# Patient Record
Sex: FEMALE | Race: ASIAN | Employment: FULL TIME | URBAN - METROPOLITAN AREA
[De-identification: names, ages, dates, MRNs, and addresses within clinical notes are randomized per-mention and may not be internally consistent; named-entity substitution may affect disease eponyms.]

---

## 2017-07-23 ENCOUNTER — OFFICE VISIT (OUTPATIENT)
Dept: FAMILY MEDICINE CLINIC | Facility: CLINIC | Age: 37
End: 2017-07-23

## 2017-07-23 VITALS
TEMPERATURE: 98 F | WEIGHT: 136 LBS | HEIGHT: 62 IN | HEART RATE: 76 BPM | BODY MASS INDEX: 25.03 KG/M2 | OXYGEN SATURATION: 99 % | SYSTOLIC BLOOD PRESSURE: 100 MMHG | DIASTOLIC BLOOD PRESSURE: 70 MMHG | RESPIRATION RATE: 12 BRPM

## 2017-07-23 DIAGNOSIS — H69.81 EUSTACHIAN TUBE DYSFUNCTION, RIGHT: Primary | ICD-10-CM

## 2017-07-23 PROCEDURE — 99213 OFFICE O/P EST LOW 20 MIN: CPT | Performed by: NURSE PRACTITIONER

## 2017-07-23 NOTE — PROGRESS NOTES
CHIEF COMPLAINT:   Patient presents with:  Ear Pain        HPI:   Armando Harper is a 40year old female who presents for  Right ear pain mainly behind ear going to throat, feels clogged with mild headache. Problem has been occurring for  3-4  days.   Not TM- bilateral:no erythema, mild bulging, + effusion. Hearing normal. Tenderness behind right ear near eustachian tube. EAR canals bilaterally: no erythema, no tenderness, no discharge.   NECK: supple,no adenopathy  LUNGS: clear to auscultation bilaterally But eustachian tube problems sometimes lead to serious problems, such as:    ?A middle ear infection  ? A torn eardrum  ? Hearing loss  Long-term hearing loss from eustachian tube problems can also lead to language or speech problems in children.     What cau - OTC nasal sprays like Afrin (oxymetazoline) can be used every 12 hours for  NO MORE than 3 days. Longer use leads to worsening congestion and blood  pressure issues. This medication should be avoided in patients with high blood  pressure  ? Antihistamin It often takes from several weeks up to 3 months for the fluid to clear on its own. Oral pain relievers and ear drops help if there is pain. Decongestants and antihistamines sometimes help. Antibiotics don't help since there is no infection.  Your doctor ma · Fever of 100.4°F (38°C) or higher, or as directed by your healthcare provider  · Fluid or blood draining from the ear  · Headache or sinus pain  · Stiff neck  · Unusual drowsiness or confusion  Date Last Reviewed: 10/1/2016  © 8923-4639 [employer]

## 2017-07-23 NOTE — PATIENT INSTRUCTIONS
Eustachian tube problems  Written by the doctors and editors at [facility]     What is the eustachian tube? — The eustachian tube is a tube that connects the middle ear (the part of the ear behind the eardrum) to the back of the nose and throat       Norm[place] Should I see a doctor or nurse? — See your doctor or nurse if your symptoms are severe, get worse, or if they don’t go away after a few days. Will I need tests? — Probably not.  Your doctor or nurse should be able to tell if you have a eustachian tube pr Earache, No Infection (Adult)  Earaches can happen without an infection. This occurs when air and fluid build up behind the eardrum causing a feeling of fullness and discomfort and reduced hearing.  This is called otitis media with effusion (OME) or serous · You may use over-the-counter medicine as directed to control pain, unless another medicine was prescribed. If you have chronic liver or kidney disease or ever had a stomach ulcer or GI bleeding, talk with your doctor before using these medicines.  Aspirin

## 2017-09-05 ENCOUNTER — OFFICE VISIT (OUTPATIENT)
Dept: FAMILY MEDICINE CLINIC | Facility: CLINIC | Age: 37
End: 2017-09-05

## 2017-09-05 VITALS
HEART RATE: 76 BPM | OXYGEN SATURATION: 98 % | SYSTOLIC BLOOD PRESSURE: 110 MMHG | DIASTOLIC BLOOD PRESSURE: 80 MMHG | TEMPERATURE: 98 F | RESPIRATION RATE: 16 BRPM

## 2017-09-05 DIAGNOSIS — J02.9 PHARYNGITIS, UNSPECIFIED ETIOLOGY: Primary | ICD-10-CM

## 2017-09-05 LAB — CONTROL LINE PRESENT WITH A CLEAR BACKGROUND (YES/NO): YES YES/NO

## 2017-09-05 PROCEDURE — 87081 CULTURE SCREEN ONLY: CPT | Performed by: PHYSICIAN ASSISTANT

## 2017-09-05 PROCEDURE — 87880 STREP A ASSAY W/OPTIC: CPT | Performed by: PHYSICIAN ASSISTANT

## 2017-09-05 PROCEDURE — 99213 OFFICE O/P EST LOW 20 MIN: CPT | Performed by: PHYSICIAN ASSISTANT

## 2017-09-05 NOTE — PROGRESS NOTES
CHIEF COMPLAINT:   Patient presents with:  Headache: sore throat, fever. Tmax less than 100.  2-3 days. HPI:   Syeda Alonso is 40year old female presents to clinic with complaint of  sore throat, headache, mild bodyache, fatigue.   Symptoms 3 day hour(s))  -STREP A ASSAY W/OPTIC   Collection Time: 09/05/17  6:26 PM   Result Value Ref Range   STREP GRP A CUL-SCR neg Negative   Control Line Present with a clear background (yes/no) yes Yes/No   Kit Lot # [id number] Numeric   Kit Expiration Date 1/31/1

## 2017-09-05 NOTE — PATIENT INSTRUCTIONS
Viral Pharyngitis (Sore Throat)    You (or your child, if your child is the patient) have pharyngitis (sore throat). This infection is caused by a virus. It can cause throat pain that is worse when swallowing, aching all over, headache, and fever.  The in · Fever as directed by your doctor.  For children, seek care if:  ¨ Your child is of any age and has repeated fevers above 104°F (40°C). ¨ Your child is younger than 3years of age and has a fever of 100.4°F (38°C) that continues for more than 1 day.   Yolanda Guevara

## 2017-09-13 ENCOUNTER — OFFICE VISIT (OUTPATIENT)
Dept: FAMILY MEDICINE CLINIC | Facility: CLINIC | Age: 37
End: 2017-09-13

## 2017-09-13 VITALS
OXYGEN SATURATION: 100 % | HEART RATE: 99 BPM | WEIGHT: 140 LBS | SYSTOLIC BLOOD PRESSURE: 114 MMHG | DIASTOLIC BLOOD PRESSURE: 78 MMHG | TEMPERATURE: 98 F | RESPIRATION RATE: 20 BRPM | BODY MASS INDEX: 26 KG/M2

## 2017-09-13 DIAGNOSIS — J30.1 ACUTE ALLERGIC RHINITIS DUE TO POLLEN, UNSPECIFIED SEASONALITY: Primary | ICD-10-CM

## 2017-09-13 PROCEDURE — 99213 OFFICE O/P EST LOW 20 MIN: CPT | Performed by: NURSE PRACTITIONER

## 2017-09-13 RX ORDER — FLUTICASONE PROPIONATE 50 MCG
2 SPRAY, SUSPENSION (ML) NASAL DAILY
Qty: 1 BOTTLE | Refills: 3 | Status: SHIPPED | OUTPATIENT
Start: 2017-09-13 | End: 2018-09-08

## 2017-09-13 NOTE — PROGRESS NOTES
CHIEF COMPLAINT:   Patient presents with:  Sore Throat        HPI:     The patient complains of sore throat, loss of voice and post nasal drip. Has had for 10 days. Symptoms include: nasal congestion,clear rhinorrhea, ST, and slight cough.  Denies fever or NECK: supple, no adenopathy  LUNGS: clear to auscultation  CARDIO: RRR without murmur      ASSESSMENT AND PLAN:   Sherly Mccoy is a 40year old female who presents with allergy symptoms.     ASSESSMENT:  Acute allergic rhinitis due to pollen, unspecified se Common nasal allergy symptoms  Allergies can cause nasal tissue to swell. This makes the air passages smaller. The nose may feel stuffed up. The nose may also make extra mucus, which can plug the nasal passages or drip out of the nose.  Mucus can drip down

## 2017-09-13 NOTE — PATIENT INSTRUCTIONS
Patient advised to start antihistamine (Claritin/Zyrtec/Allegra) daily along with nasal steroid spray the entire growing season.      Shower before bedtime and pollen avoidance, keep windows closed        Understanding Nasal Allergies  Nasal allergies (also Other health problems can cause symptoms like those of nasal allergies.  These include:  · Nonallergic rhinitis and viruses such as colds  · Irritants and pollutants, such as strong odors or smoke  · Certain medicines  · Changes in the weather   Treatment

## 2017-10-03 ENCOUNTER — OFFICE VISIT (OUTPATIENT)
Dept: FAMILY MEDICINE CLINIC | Facility: CLINIC | Age: 37
End: 2017-10-03

## 2017-10-03 VITALS
BODY MASS INDEX: 25.62 KG/M2 | HEART RATE: 74 BPM | HEIGHT: 62 IN | DIASTOLIC BLOOD PRESSURE: 62 MMHG | SYSTOLIC BLOOD PRESSURE: 98 MMHG | RESPIRATION RATE: 20 BRPM | WEIGHT: 139.25 LBS | TEMPERATURE: 98 F

## 2017-10-03 DIAGNOSIS — Z00.00 ROUTINE GENERAL MEDICAL EXAMINATION AT HEALTH CARE FACILITY: Primary | ICD-10-CM

## 2017-10-03 PROCEDURE — 99395 PREV VISIT EST AGE 18-39: CPT | Performed by: FAMILY MEDICINE

## 2017-10-03 PROCEDURE — 90686 IIV4 VACC NO PRSV 0.5 ML IM: CPT | Performed by: FAMILY MEDICINE

## 2017-10-03 PROCEDURE — 90471 IMMUNIZATION ADMIN: CPT | Performed by: FAMILY MEDICINE

## 2017-10-03 NOTE — PROGRESS NOTES
Syeda Alonso is a 40year old female here for Patient presents with: Well Adult: Cpx only    HPI:   Patient is seen for her annual physical.  Due for pap next year  Does do breast self exam at home.       PAST MEDICAL HISTORY:     Diagnosis Date   • Hypert incontinence with urination. OB/GYN: Not pregnant, menses regular, not excessive in amount, no dysmenorrhea. No sexual dysfunction or difficulty with libido. Rheumatologic: no joint pain, swelling, stiffness. No myalgias.  No history of autoimmune disorde THYROID STIM HORMONE; Future  -     LIPID PANEL; Future  -     COMP METABOLIC PANEL (14);  Future  -     VITAMIN D, 25-HYDROXY; Future    Other orders  -     FLULAVAL INFLUENZA VACCINE QUAD PRESERVATIVE FREE 0.5 ML

## 2017-10-19 ENCOUNTER — LABORATORY ENCOUNTER (OUTPATIENT)
Dept: LAB | Age: 37
End: 2017-10-19
Attending: FAMILY MEDICINE
Payer: COMMERCIAL

## 2017-10-19 DIAGNOSIS — Z00.00 ROUTINE GENERAL MEDICAL EXAMINATION AT HEALTH CARE FACILITY: ICD-10-CM

## 2017-10-19 PROCEDURE — 36415 COLL VENOUS BLD VENIPUNCTURE: CPT | Performed by: FAMILY MEDICINE

## 2017-10-19 PROCEDURE — 80050 GENERAL HEALTH PANEL: CPT | Performed by: FAMILY MEDICINE

## 2017-10-19 PROCEDURE — 80061 LIPID PANEL: CPT | Performed by: FAMILY MEDICINE

## 2017-10-19 PROCEDURE — 82306 VITAMIN D 25 HYDROXY: CPT | Performed by: FAMILY MEDICINE

## 2018-01-09 DIAGNOSIS — E55.9 VITAMIN D DEFICIENCY: Primary | ICD-10-CM

## 2018-01-09 NOTE — TELEPHONE ENCOUNTER
No future appointments.     LOV     LAST LAB    Ref Range & Units 10/19/17  9:26 AM   25-Hydroxyvitamin D (Total) 30.0 - 100.0 ng/mL 20.7     Comments:           LAST RX  ergocalciferol 94915 units Oral Cap 4 capsule 2 10/20/2017 11/19/2017   Sig :  Take 1

## 2018-01-10 RX ORDER — ERGOCALCIFEROL 1.25 MG/1
CAPSULE ORAL
Qty: 4 CAPSULE | Refills: 2 | OUTPATIENT
Start: 2018-01-10

## 2018-09-13 ENCOUNTER — OFFICE VISIT (OUTPATIENT)
Dept: FAMILY MEDICINE CLINIC | Facility: CLINIC | Age: 38
End: 2018-09-13
Payer: COMMERCIAL

## 2018-09-13 VITALS
HEIGHT: 61 IN | BODY MASS INDEX: 26.06 KG/M2 | WEIGHT: 138 LBS | SYSTOLIC BLOOD PRESSURE: 102 MMHG | DIASTOLIC BLOOD PRESSURE: 70 MMHG | HEART RATE: 68 BPM | TEMPERATURE: 99 F | RESPIRATION RATE: 14 BRPM

## 2018-09-13 DIAGNOSIS — Z00.00 ROUTINE GENERAL MEDICAL EXAMINATION AT A HEALTH CARE FACILITY: Primary | ICD-10-CM

## 2018-09-13 DIAGNOSIS — Z01.419 ENCOUNTER FOR ROUTINE GYNECOLOGICAL EXAMINATION WITH PAPANICOLAOU SMEAR OF CERVIX: ICD-10-CM

## 2018-09-13 DIAGNOSIS — E55.9 VITAMIN D DEFICIENCY: ICD-10-CM

## 2018-09-13 PROCEDURE — 99395 PREV VISIT EST AGE 18-39: CPT | Performed by: FAMILY MEDICINE

## 2018-09-13 PROCEDURE — 88175 CYTOPATH C/V AUTO FLUID REDO: CPT | Performed by: FAMILY MEDICINE

## 2018-09-13 PROCEDURE — 87624 HPV HI-RISK TYP POOLED RSLT: CPT | Performed by: FAMILY MEDICINE

## 2018-09-13 NOTE — PATIENT INSTRUCTIONS
Prevention Guidelines, Women Ages 25 to 44  Screening tests and vaccines are an important part of managing your health. A screening test is done to find possible disorders or diseases in people who don't have any symptoms.  The goal is to find a disease e Type 2 diabetes All women with prediabetes Every year   Gonorrhea Sexually active women at increased risk for infection At routine exams   Hepatitis C Anyone at increased risk At routine exams   HIV All women should be tested at least once for HIV between Meningococcal Women at increased risk for infection should talk with their healthcare provider 1 or more doses   Pneumococcal conjugate vaccine (PCV13) and pneumococcal polysaccharide vaccine (PPSV23) Women at increased risk for infection should talk with © 8932-4109 [employer] [address] [address] [facility] [place], [facility]. All rights reserved. This information is not intended as a substitute for professional medical care. Always follow your healthcare professional's instructions.

## 2018-09-13 NOTE — PROGRESS NOTES
Mishel Schmidt is a 41year old female here for Patient presents with:  Patient presents with: Well Adult: Physical and pap    HPI:   Patient is seen for her annual physical.  Due for pap   Does do breast self exam at home.     PAST MEDICAL HISTORY:     Diag urgency or incontinence with urination. OB/GYN: Not pregnant, menses regular, not excessive in amount, no dysmenorrhea. No sexual dysfunction or difficulty with libido. Rheumatologic: no joint pain, swelling, stiffness. No myalgias.  No history of autoimm bilaterally. PSYCH: Well-groomed, appropriate mood and affect, good eye contact, normal speech and no thought disorder. ASSESSMENT AND PLAN:   Los Briggs was seen today for well adult.     Diagnoses and all orders for this visit:    Routine general medical e

## 2018-09-14 LAB — HPV I/H RISK 1 DNA SPEC QL NAA+PROBE: NEGATIVE

## 2018-09-17 LAB
LAST PAP RESULT: NORMAL
PAP HISTORY (OTHER THAN LAST PAP): NORMAL

## 2018-09-25 ENCOUNTER — LAB ENCOUNTER (OUTPATIENT)
Dept: LAB | Age: 38
End: 2018-09-25
Attending: FAMILY MEDICINE
Payer: COMMERCIAL

## 2018-09-25 DIAGNOSIS — Z00.00 ROUTINE GENERAL MEDICAL EXAMINATION AT A HEALTH CARE FACILITY: ICD-10-CM

## 2018-09-25 DIAGNOSIS — E55.9 VITAMIN D DEFICIENCY: ICD-10-CM

## 2018-09-25 LAB
ALBUMIN SERPL-MCNC: 3.5 G/DL (ref 3.5–4.8)
ALBUMIN/GLOB SERPL: 0.8 {RATIO} (ref 1–2)
ALP LIVER SERPL-CCNC: 74 U/L (ref 37–98)
ALT SERPL-CCNC: 24 U/L (ref 14–54)
ANION GAP SERPL CALC-SCNC: 5 MMOL/L (ref 0–18)
AST SERPL-CCNC: 19 U/L (ref 15–41)
BASOPHILS # BLD AUTO: 0.02 X10(3) UL (ref 0–0.1)
BASOPHILS NFR BLD AUTO: 0.2 %
BILIRUB SERPL-MCNC: 0.5 MG/DL (ref 0.1–2)
BUN BLD-MCNC: 8 MG/DL (ref 8–20)
BUN/CREAT SERPL: 9.1 (ref 10–20)
CALCIUM BLD-MCNC: 8.7 MG/DL (ref 8.3–10.3)
CHLORIDE SERPL-SCNC: 108 MMOL/L (ref 101–111)
CHOLEST SMN-MCNC: 181 MG/DL (ref ?–200)
CO2 SERPL-SCNC: 26 MMOL/L (ref 22–32)
CREAT BLD-MCNC: 0.88 MG/DL (ref 0.55–1.02)
EOSINOPHIL # BLD AUTO: 0.24 X10(3) UL (ref 0–0.3)
EOSINOPHIL NFR BLD AUTO: 2.8 %
ERYTHROCYTE [DISTWIDTH] IN BLOOD BY AUTOMATED COUNT: 12.9 % (ref 11.5–16)
GLOBULIN PLAS-MCNC: 4.4 G/DL (ref 2.5–4)
GLUCOSE BLD-MCNC: 78 MG/DL (ref 70–99)
HCT VFR BLD AUTO: 42.1 % (ref 34–50)
HDLC SERPL-MCNC: 30 MG/DL (ref 40–59)
HGB BLD-MCNC: 13.8 G/DL (ref 12–16)
IMMATURE GRANULOCYTE COUNT: 0.02 X10(3) UL (ref 0–1)
IMMATURE GRANULOCYTE RATIO %: 0.2 %
LDLC SERPL CALC-MCNC: 117 MG/DL (ref ?–100)
LYMPHOCYTES # BLD AUTO: 2.26 X10(3) UL (ref 0.9–4)
LYMPHOCYTES NFR BLD AUTO: 26.7 %
M PROTEIN MFR SERPL ELPH: 7.9 G/DL (ref 6.1–8.3)
MCH RBC QN AUTO: 30.8 PG (ref 27–33.2)
MCHC RBC AUTO-ENTMCNC: 32.8 G/DL (ref 31–37)
MCV RBC AUTO: 94 FL (ref 81–100)
MONOCYTES # BLD AUTO: 0.52 X10(3) UL (ref 0.1–1)
MONOCYTES NFR BLD AUTO: 6.2 %
NEUTROPHIL ABS PRELIM: 5.39 X10 (3) UL (ref 1.3–6.7)
NEUTROPHILS # BLD AUTO: 5.39 X10(3) UL (ref 1.3–6.7)
NEUTROPHILS NFR BLD AUTO: 63.9 %
NONHDLC SERPL-MCNC: 151 MG/DL (ref ?–130)
OSMOLALITY SERPL CALC.SUM OF ELEC: 285 MOSM/KG (ref 275–295)
PLATELET # BLD AUTO: 304 10(3)UL (ref 150–450)
POTASSIUM SERPL-SCNC: 5.1 MMOL/L (ref 3.6–5.1)
RBC # BLD AUTO: 4.48 X10(6)UL (ref 3.8–5.1)
RED CELL DISTRIBUTION WIDTH-SD: 44.2 FL (ref 35.1–46.3)
SODIUM SERPL-SCNC: 139 MMOL/L (ref 136–144)
TRIGL SERPL-MCNC: 172 MG/DL (ref 30–149)
TSI SER-ACNC: 3.87 MIU/ML (ref 0.35–5.5)
VIT D+METAB SERPL-MCNC: 18 NG/ML (ref 30–100)
VLDLC SERPL CALC-MCNC: 34 MG/DL (ref 0–30)
WBC # BLD AUTO: 8.5 X10(3) UL (ref 4–13)

## 2018-09-25 PROCEDURE — 82306 VITAMIN D 25 HYDROXY: CPT | Performed by: FAMILY MEDICINE

## 2018-09-25 PROCEDURE — 80061 LIPID PANEL: CPT | Performed by: FAMILY MEDICINE

## 2018-09-25 PROCEDURE — 80050 GENERAL HEALTH PANEL: CPT | Performed by: FAMILY MEDICINE

## 2019-01-16 ENCOUNTER — OFFICE VISIT (OUTPATIENT)
Dept: FAMILY MEDICINE CLINIC | Facility: CLINIC | Age: 39
End: 2019-01-16
Payer: COMMERCIAL

## 2019-01-16 VITALS
BODY MASS INDEX: 27 KG/M2 | HEART RATE: 79 BPM | OXYGEN SATURATION: 98 % | TEMPERATURE: 98 F | WEIGHT: 141.19 LBS | RESPIRATION RATE: 18 BRPM | DIASTOLIC BLOOD PRESSURE: 74 MMHG | SYSTOLIC BLOOD PRESSURE: 118 MMHG

## 2019-01-16 DIAGNOSIS — J01.10 ACUTE NON-RECURRENT FRONTAL SINUSITIS: Primary | ICD-10-CM

## 2019-01-16 DIAGNOSIS — E55.9 VITAMIN D DEFICIENCY: ICD-10-CM

## 2019-01-16 PROCEDURE — 99214 OFFICE O/P EST MOD 30 MIN: CPT | Performed by: FAMILY MEDICINE

## 2019-01-16 RX ORDER — AMOXICILLIN AND CLAVULANATE POTASSIUM 875; 125 MG/1; MG/1
1 TABLET, FILM COATED ORAL 2 TIMES DAILY
Qty: 14 TABLET | Refills: 0 | Status: SHIPPED | OUTPATIENT
Start: 2019-01-16 | End: 2019-01-23

## 2019-01-16 RX ORDER — FLUTICASONE PROPIONATE 50 MCG
2 SPRAY, SUSPENSION (ML) NASAL DAILY
Qty: 1 BOTTLE | Refills: 0 | Status: SHIPPED | OUTPATIENT
Start: 2019-01-16 | End: 2019-02-15

## 2019-01-16 NOTE — PROGRESS NOTES
CHIEF COMPLAINT: Patient presents with:  Headache: x4d  Runny Nose  Body ache and/or chills  Cough        HPI:     Leda Hart is a 41year old female presents for sore throat and nasal congestion.     Pt p/w her  today with a 4-day h/o of HA, sore breath and wheezing. Gastrointestinal: Negative for nausea, vomiting, abdominal pain and diarrhea. Musculoskeletal: Positive for myalgias. Pertinent positives and negatives noted in the the HPI.     PHYSICAL EXAM:   /74 (BP Location: Left 0. 5    • Total Protein 09/25/2018 7.9    • Albumin 09/25/2018 3.5    • Globulin  09/25/2018 4.4*   • A/G Ratio 09/25/2018 0.8*   • TSH 09/25/2018 3.870    • Cholesterol, Total 09/25/2018 181    • HDL Cholesterol 09/25/2018 30*   • Triglycerides 09/25/2018 50 MCG/ACT Nasal Suspension 1 Bottle 0     Si sprays by Each Nare route daily. • Amoxicillin-Pot Clavulanate 875-125 MG Oral Tab 14 tablet 0     Sig: Take 1 tablet by mouth 2 (two) times daily for 7 days.        Health Maintenance:  Influenza Vaccine(

## 2019-01-16 NOTE — PATIENT INSTRUCTIONS
Acute Sinusitis    Acute sinusitis is irritation and swelling of the sinuses. It is usually caused by a viral infection after a common cold. Your doctor can help you find relief. What is acute sinusitis?   Sinuses are air-filled spaces in the skull behin © 4636-4671 [employer] [address]. [address] [facility], [facility] [place]. All rights reserved. This information is not intended as a substitute for professional medical care. Always follow your healthcare professional's instructions.

## 2019-01-17 RX ORDER — ERGOCALCIFEROL 1.25 MG/1
CAPSULE ORAL
Qty: 4 CAPSULE | Refills: 2 | OUTPATIENT
Start: 2019-01-17

## 2019-02-12 DIAGNOSIS — J01.10 ACUTE NON-RECURRENT FRONTAL SINUSITIS: ICD-10-CM

## 2019-02-12 RX ORDER — FLUTICASONE PROPIONATE 50 MCG
SPRAY, SUSPENSION (ML) NASAL
Refills: 0 | OUTPATIENT
Start: 2019-02-12

## 2019-02-12 NOTE — TELEPHONE ENCOUNTER
Refill denied for fluticasone, refill denied ordered by [facility] provider

## 2019-02-16 ENCOUNTER — APPOINTMENT (OUTPATIENT)
Dept: GENERAL RADIOLOGY | Facility: HOSPITAL | Age: 39
End: 2019-02-16
Attending: EMERGENCY MEDICINE
Payer: COMMERCIAL

## 2019-02-16 ENCOUNTER — HOSPITAL ENCOUNTER (EMERGENCY)
Facility: HOSPITAL | Age: 39
Discharge: HOME | End: 2019-02-16
Attending: EMERGENCY MEDICINE
Payer: COMMERCIAL

## 2019-02-16 VITALS
HEIGHT: 62 IN | SYSTOLIC BLOOD PRESSURE: 129 MMHG | RESPIRATION RATE: 16 BRPM | OXYGEN SATURATION: 100 % | HEART RATE: 70 BPM | TEMPERATURE: 98 F | BODY MASS INDEX: 24.84 KG/M2 | DIASTOLIC BLOOD PRESSURE: 84 MMHG | WEIGHT: 135 LBS

## 2019-02-16 DIAGNOSIS — S70.01XA CONTUSION OF RIGHT HIP, INITIAL ENCOUNTER: Primary | ICD-10-CM

## 2019-02-16 DIAGNOSIS — J01.10 ACUTE NON-RECURRENT FRONTAL SINUSITIS: ICD-10-CM

## 2019-02-16 PROCEDURE — 73502 X-RAY EXAM HIP UNI 2-3 VIEWS: CPT | Performed by: EMERGENCY MEDICINE

## 2019-02-16 PROCEDURE — 99283 EMERGENCY DEPT VISIT LOW MDM: CPT

## 2019-02-16 RX ORDER — HYDROCODONE BITARTRATE AND ACETAMINOPHEN 5; 325 MG/1; MG/1
1-2 TABLET ORAL EVERY 4 HOURS PRN
Qty: 10 TABLET | Refills: 0 | Status: SHIPPED | OUTPATIENT
Start: 2019-02-16 | End: 2019-02-23

## 2019-02-17 NOTE — ED INITIAL ASSESSMENT (HPI)
Patient complaining of low back pain after falling approximately one hour ago. Patient states she slipped and fell down 2 steps. Denies LOC. Denies head injury. Denies neck pain. Complaining of lower back pain.  Patient given Fentanyl IV push by EMS en rout

## 2019-02-18 RX ORDER — FLUTICASONE PROPIONATE 50 MCG
SPRAY, SUSPENSION (ML) NASAL
Refills: 0 | OUTPATIENT
Start: 2019-02-18

## 2019-11-25 ENCOUNTER — TELEPHONE (OUTPATIENT)
Dept: FAMILY MEDICINE CLINIC | Facility: CLINIC | Age: 39
End: 2019-11-25

## 2019-11-25 ENCOUNTER — OFFICE VISIT (OUTPATIENT)
Dept: FAMILY MEDICINE CLINIC | Facility: CLINIC | Age: 39
End: 2019-11-25
Payer: COMMERCIAL

## 2019-11-25 VITALS
RESPIRATION RATE: 16 BRPM | WEIGHT: 142 LBS | HEART RATE: 73 BPM | BODY MASS INDEX: 27.52 KG/M2 | SYSTOLIC BLOOD PRESSURE: 120 MMHG | HEIGHT: 60.35 IN | TEMPERATURE: 99 F | OXYGEN SATURATION: 97 % | DIASTOLIC BLOOD PRESSURE: 80 MMHG

## 2019-11-25 DIAGNOSIS — J02.9 SORE THROAT: ICD-10-CM

## 2019-11-25 DIAGNOSIS — J01.00 ACUTE MAXILLARY SINUSITIS, RECURRENCE NOT SPECIFIED: Primary | ICD-10-CM

## 2019-11-25 PROCEDURE — 87880 STREP A ASSAY W/OPTIC: CPT | Performed by: FAMILY MEDICINE

## 2019-11-25 PROCEDURE — 99214 OFFICE O/P EST MOD 30 MIN: CPT | Performed by: FAMILY MEDICINE

## 2019-11-25 RX ORDER — AMOXICILLIN AND CLAVULANATE POTASSIUM 875; 125 MG/1; MG/1
1 TABLET, FILM COATED ORAL 2 TIMES DAILY
Qty: 14 TABLET | Refills: 0 | Status: SHIPPED | OUTPATIENT
Start: 2019-11-25 | End: 2019-12-02

## 2019-11-25 RX ORDER — OMEGA-3 FATTY ACIDS/FISH OIL 300-1000MG
CAPSULE ORAL
COMMUNITY
End: 2020-01-31 | Stop reason: ALTCHOICE

## 2019-11-25 NOTE — PROGRESS NOTES
CHIEF COMPLAINT: Patient presents with:  Cough: running nose, headache, sore throat, yellow phlegm x5 days ago        HPI:     Robert Leal is a 46year old female presents for cold symptoms and cough.     Lizzette Guerrero is a 45 yo F p/w cough and cold symptoms x 5 shortness of breath. Gastrointestinal: Negative for nausea, vomiting, abdominal pain and diarrhea. Musculoskeletal: Positive for myalgias. Neurological: Negative for headaches. Pertinent positives and negatives noted in the the HPI.     PHYSIC • Kit Expiration Date 11/25/[id number]       REVIEWED THIS VISIT  ASSESSMENT/PLAN:   46year old female with    1.  Acute maxillary sinusitis, recurrence not specified  Day #5 of illness, explained to pt this is likely a viral infection and expect to

## 2019-11-25 NOTE — TELEPHONE ENCOUNTER
E scripts are down systemwide. Rx for Augmentin from 11/25/[facility] visit was called in.   Spoke with pharmacist.

## 2020-01-31 ENCOUNTER — OFFICE VISIT (OUTPATIENT)
Dept: FAMILY MEDICINE CLINIC | Facility: CLINIC | Age: 40
End: 2020-01-31
Payer: COMMERCIAL

## 2020-01-31 ENCOUNTER — TELEPHONE (OUTPATIENT)
Dept: FAMILY MEDICINE CLINIC | Facility: CLINIC | Age: 40
End: 2020-01-31

## 2020-01-31 VITALS
WEIGHT: 141 LBS | TEMPERATURE: 98 F | BODY MASS INDEX: 27 KG/M2 | DIASTOLIC BLOOD PRESSURE: 92 MMHG | SYSTOLIC BLOOD PRESSURE: 130 MMHG | HEART RATE: 93 BPM | RESPIRATION RATE: 16 BRPM | OXYGEN SATURATION: 98 %

## 2020-01-31 DIAGNOSIS — R03.0 ELEVATED BP WITHOUT DIAGNOSIS OF HYPERTENSION: ICD-10-CM

## 2020-01-31 DIAGNOSIS — R42 DIZZINESS: Primary | ICD-10-CM

## 2020-01-31 DIAGNOSIS — R51.9 ACUTE NONINTRACTABLE HEADACHE, UNSPECIFIED HEADACHE TYPE: ICD-10-CM

## 2020-01-31 PROCEDURE — 99213 OFFICE O/P EST LOW 20 MIN: CPT | Performed by: PHYSICIAN ASSISTANT

## 2020-01-31 RX ORDER — MECLIZINE HYDROCHLORIDE 25 MG/1
25 TABLET ORAL 3 TIMES DAILY PRN
Qty: 20 TABLET | Refills: 0 | Status: SHIPPED | OUTPATIENT
Start: 2020-01-31 | End: 2020-03-06 | Stop reason: ALTCHOICE

## 2020-01-31 NOTE — PROGRESS NOTES
CHIEF COMPLAINT:     Patient presents with:  Dizziness  Headache  :      HPI:     Harsh Decker is a 46year old female presents with complaints of dizziness. Symptoms mostly since this morning.      She describes a \"feeling of being in a different plane\" NEURO: No seizure, tremor, confusion, no focal weakness or abnormal sensation    EXAM:   BP (!) 130/92   Pulse 93   Temp 98 °F (36.7 °C)   Resp 16   Wt 141 lb (64 kg)   LMP 01/03/2020   SpO2 98%   BMI 27.22 kg/m²   GENERAL:WNWD NAD ambulated into room with Signed Prescriptions Disp Refills   • Meclizine HCl 25 MG Oral Tab 20 tablet 0     Sig: Take 1 tablet (25 mg total) by mouth 3 (three) times daily as needed. Risks, benefits, side effects of medication explained/discussed.     Patient Instructions The healthcare provider will ask about your symptoms and your medical history. He or she will examine you. You may have hearing and balance tests. As part of the exam, your healthcare provider may have you move your head and body in certain ways.  If you ha

## 2020-01-31 NOTE — TELEPHONE ENCOUNTER
Spoke to patient who states woke up this am feeling dizzy and nauseous when she stands. Also has slight HA in the back of her head. Denies vomiting, visual changes, N/T or other symptoms. Took her B/P: 140/100. No history of hypertension.   Discussed with

## 2020-02-05 ENCOUNTER — OFFICE VISIT (OUTPATIENT)
Dept: FAMILY MEDICINE CLINIC | Facility: CLINIC | Age: 40
End: 2020-02-05
Payer: COMMERCIAL

## 2020-02-05 VITALS
BODY MASS INDEX: 27.71 KG/M2 | HEIGHT: 60.35 IN | DIASTOLIC BLOOD PRESSURE: 84 MMHG | OXYGEN SATURATION: 98 % | HEART RATE: 76 BPM | WEIGHT: 143 LBS | SYSTOLIC BLOOD PRESSURE: 124 MMHG | RESPIRATION RATE: 18 BRPM | TEMPERATURE: 98 F

## 2020-02-05 DIAGNOSIS — R51.9 HEADACHE DISORDER: Primary | ICD-10-CM

## 2020-02-05 DIAGNOSIS — M62.838 SPASM OF MUSCLE: ICD-10-CM

## 2020-02-05 DIAGNOSIS — H81.13 BPPV (BENIGN PAROXYSMAL POSITIONAL VERTIGO), BILATERAL: ICD-10-CM

## 2020-02-05 DIAGNOSIS — M54.2 NECK PAIN: ICD-10-CM
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
## 2020-02-05 PROCEDURE — 99214 OFFICE O/P EST MOD 30 MIN: CPT | Performed by: FAMILY MEDICINE

## 2020-02-05 RX ORDER — CYCLOBENZAPRINE HCL 10 MG
10 TABLET ORAL NIGHTLY
Qty: 15 TABLET | Refills: 0 | Status: SHIPPED | OUTPATIENT
Start: 2020-02-05 | End: 2020-05-07

## 2020-02-05 RX ORDER — NAPROXEN 500 MG/1
500 TABLET ORAL 2 TIMES DAILY WITH MEALS
Qty: 30 TABLET | Refills: 0 | Status: SHIPPED | OUTPATIENT
Start: 2020-02-05 | End: 2020-05-07

## 2020-02-05 NOTE — PROGRESS NOTES
Kevin Dickey is a 46year old female.   Patient presents with:  Blood Pressure  Headache    HPI:   Patient complaining that she had dizziness on Thursday last week, states checked her blood pressure and it was 140/100, went to walk in clinic, blood pressure but with discomfort. LUNGS: clear to auscultation  CARDIO: RRR without murmur  NEURO: CN 2-12 are normal, no focal motor or sensory deficits on exam.    ASSESSMENT AND PLAN:   Eulalio Tripathi was seen today for blood pressure and headache.     Diagnoses and all orde

## 2020-02-05 NOTE — PATIENT INSTRUCTIONS
You can continue to see your chiropractor but if your neck pain, headache and dizziness are not better will refer to a physical therapist.    Please do the recommended Epley's exercises at home. You can continue to take meclizine as needed.       Neck Probl include:  · Heat. A special heating pad called a neck pack may be applied to your neck. · Exercises. Your PT will teach you exercises to help strengthen your neck and improve its range of motion.   · Joint mobilization. The PT gently moves your vertebrae t

## 2020-02-16 DIAGNOSIS — M62.838 SPASM OF MUSCLE: ICD-10-CM

## 2020-02-18 RX ORDER — CYCLOBENZAPRINE HCL 10 MG
10 TABLET ORAL NIGHTLY
Qty: 15 TABLET | Refills: 0 | OUTPATIENT
Start: 2020-02-18 | End: 2020-03-04

## 2020-02-18 NOTE — TELEPHONE ENCOUNTER
LOV 2/5/20   Return in about 2 weeks (around 2/19/2020). LAST LAB 9/18    LAST RX   cyclobenzaprine 10 MG Oral Tab 15 tablet 0 2/5/2020 2/20/2020   Sig:   Take 1 tablet (10 mg total) by mouth nightly for 15 days.            Next OV 3/6/2020      PROT

## 2020-03-06 ENCOUNTER — OFFICE VISIT (OUTPATIENT)
Dept: FAMILY MEDICINE CLINIC | Facility: CLINIC | Age: 40
End: 2020-03-06
Payer: COMMERCIAL

## 2020-03-06 VITALS
WEIGHT: 140 LBS | OXYGEN SATURATION: 98 % | RESPIRATION RATE: 18 BRPM | HEIGHT: 61 IN | DIASTOLIC BLOOD PRESSURE: 82 MMHG | BODY MASS INDEX: 26.43 KG/M2 | HEART RATE: 91 BPM | SYSTOLIC BLOOD PRESSURE: 126 MMHG | TEMPERATURE: 97 F

## 2020-03-06 DIAGNOSIS — Z00.00 ROUTINE GENERAL MEDICAL EXAMINATION AT A HEALTH CARE FACILITY: Primary | ICD-10-CM

## 2020-03-06 DIAGNOSIS — E55.9 VITAMIN D DEFICIENCY: ICD-10-CM

## 2020-03-06 PROCEDURE — 99395 PREV VISIT EST AGE 18-39: CPT | Performed by: FAMILY MEDICINE

## 2020-03-06 NOTE — PATIENT INSTRUCTIONS
Prevention Guidelines, Women Ages 25 to 44  Screening tests and vaccines are an important part of managing your health. A screening test is done to find possible disorders or diseases in people who don't have any symptoms.  The goal is to find a disease e Type 2 diabetes, prediabetes All women diagnosed with gestational diabetes Lifelong testing every 3 years   Type 2 diabetes All women with prediabetes Every year   Gonorrhea Sexually active women at increased risk for infection At routine exams   Hepatitis Measles, mumps, rubella (MMR) All women in this age group who have no record of these infections or vaccines 1 or 2 doses   Meningococcal Women at increased risk for infection should talk with their healthcare provider 1 or more doses   Pneumococcal conjug © 2769-4499 [employer] [address]. [address] [facility] [place], [facility] [place]. All rights reserved. This information is not intended as a substitute for professional medical care. Always follow your healthcare professional's instructions.

## 2020-03-09 NOTE — PROGRESS NOTES
Conor Fischer is a 46year old female. Patient presents with:  Physical    HPI:   Conor Fischer is a 46year old female seen today for her annual physical.  Pap done 2018 was normal.  Does do breast self exam, no family history of breast cancer.     Diet and polyuria. Genitourinary: Negative for dysuria, urgency, frequency and difficulty urinating. Musculoskeletal: Negative for myalgias, joint swelling, joint pain and gait problem. Skin: Negative for rash.    Allergic/Immunologic: Negative for food allerg at a health care facility  -     ASSAY, THYROID STIM HORMONE; Future  -     LIPID PANEL; Future  -     COMP METABOLIC PANEL (14); Future  -     CBC WITH DIFFERENTIAL WITH PLATELET;  Future  -     ASSAY, THYROID STIM HORMONE  -     LIPID PANEL  -     COMP ME

## 2020-03-14 LAB
ABSOLUTE BASOPHILS: 31 CELLS/UL (ref 0–200)
ABSOLUTE EOSINOPHILS: 242 CELLS/UL (ref 15–500)
ABSOLUTE LYMPHOCYTES: 2075 CELLS/UL (ref 850–3900)
ABSOLUTE MONOCYTES: 390 CELLS/UL (ref 200–950)
ABSOLUTE NEUTROPHILS: 5062 CELLS/UL (ref 1500–7800)
ALBUMIN/GLOBULIN RATIO: 1.1 (CALC) (ref 1–2.5)
ALBUMIN: 4.1 G/DL (ref 3.6–5.1)
ALKALINE PHOSPHATASE: 91 U/L (ref 31–125)
ALT: 23 U/L (ref 6–29)
AST: 20 U/L (ref 10–30)
BASOPHILS: 0.4 %
BILIRUBIN, TOTAL: 0.5 MG/DL (ref 0.2–1.2)
BUN: 9 MG/DL (ref 7–25)
CALCIUM: 9.3 MG/DL (ref 8.6–10.2)
CARBON DIOXIDE: 26 MMOL/L (ref 20–32)
CHLORIDE: 105 MMOL/L (ref 98–110)
CHOL/HDLC RATIO: 5.1 (CALC)
CHOLESTEROL, TOTAL: 209 MG/DL
CREATININE: 0.8 MG/DL (ref 0.5–1.1)
EGFR IF AFRICN AM: 108 ML/MIN/1.73M2
EGFR IF NONAFRICN AM: 94 ML/MIN/1.73M2
EOSINOPHILS: 3.1 %
GLOBULIN: 3.6 G/DL (CALC) (ref 1.9–3.7)
GLUCOSE: 86 MG/DL (ref 65–99)
HDL CHOLESTEROL: 41 MG/DL
HEMATOCRIT: 40.3 % (ref 35–45)
HEMOGLOBIN: 14 G/DL (ref 11.7–15.5)
LDL-CHOLESTEROL: 137 MG/DL (CALC)
LYMPHOCYTES: 26.6 %
MCH: 32 PG (ref 27–33)
MCHC: 34.7 G/DL (ref 32–36)
MCV: 92 FL (ref 80–100)
MONOCYTES: 5 %
MPV: 10.8 FL (ref 7.5–12.5)
NEUTROPHILS: 64.9 %
NON-HDL CHOLESTEROL: 168 MG/DL (CALC)
PLATELET COUNT: 293 THOUSAND/UL (ref 140–400)
POTASSIUM: 4.5 MMOL/L (ref 3.5–5.3)
PROTEIN, TOTAL: 7.7 G/DL (ref 6.1–8.1)
RDW: 12.1 % (ref 11–15)
RED BLOOD CELL COUNT: 4.38 MILLION/UL (ref 3.8–5.1)
SODIUM: 137 MMOL/L (ref 135–146)
TRIGLYCERIDES: 178 MG/DL
TSH: 3.06 MIU/L
VITAMIN D, 25-OH, TOTAL: 17 NG/ML (ref 30–100)
WHITE BLOOD CELL COUNT: 7.8 THOUSAND/UL (ref 3.8–10.8)

## 2020-05-07 ENCOUNTER — TELEMEDICINE (OUTPATIENT)
Dept: FAMILY MEDICINE CLINIC | Facility: CLINIC | Age: 40
End: 2020-05-07

## 2020-05-07 ENCOUNTER — PATIENT MESSAGE (OUTPATIENT)
Dept: FAMILY MEDICINE CLINIC | Facility: CLINIC | Age: 40
End: 2020-05-07

## 2020-05-07 VITALS — HEART RATE: 75 BPM | DIASTOLIC BLOOD PRESSURE: 110 MMHG | SYSTOLIC BLOOD PRESSURE: 150 MMHG

## 2020-05-07 DIAGNOSIS — R03.0 ELEVATED BLOOD-PRESSURE READING, WITHOUT DIAGNOSIS OF HYPERTENSION: ICD-10-CM

## 2020-05-07 DIAGNOSIS — R42 DIZZINESS: ICD-10-CM

## 2020-05-07 DIAGNOSIS — M62.838 SPASM OF MUSCLE: ICD-10-CM

## 2020-05-07 DIAGNOSIS — R51.9 HEADACHE IN BACK OF HEAD: ICD-10-CM

## 2020-05-07 DIAGNOSIS — M54.2 NECK PAIN: Primary | ICD-10-CM

## 2020-05-07 PROCEDURE — 99213 OFFICE O/P EST LOW 20 MIN: CPT | Performed by: FAMILY MEDICINE

## 2020-05-07 RX ORDER — NAPROXEN 500 MG/1
500 TABLET ORAL 2 TIMES DAILY WITH MEALS
Qty: 30 TABLET | Refills: 0 | Status: SHIPPED | OUTPATIENT
Start: 2020-05-07 | End: 2020-05-22

## 2020-05-07 RX ORDER — CYCLOBENZAPRINE HCL 10 MG
10 TABLET ORAL NIGHTLY
Qty: 15 TABLET | Refills: 0 | Status: SHIPPED | OUTPATIENT
Start: 2020-05-07 | End: 2020-05-22

## 2020-05-07 RX ORDER — MECLIZINE HYDROCHLORIDE 25 MG/1
25 TABLET ORAL 3 TIMES DAILY PRN
Qty: 20 TABLET | Refills: 0 | Status: SHIPPED | OUTPATIENT
Start: 2020-05-07 | End: 2020-06-19 | Stop reason: ALTCHOICE

## 2020-05-07 NOTE — PATIENT INSTRUCTIONS
Low-Salt Choices  Eating salt (sodium) can make your body retain too much water. Extra water makes your heart work harder. Canned, packaged, and frozen foods are easy to prepare. But they are often high in sodium.  Here are some ideas for low-salt foods y Your High Blood Pressure Risk Factors   Risk factors are things that make you more likely to have a disease or condition. Do you know your risk factors for high blood pressure? You can’t do anything about some risk factors.  But other risk factors are thing © 0834-9878 [employer] [address]. [address] [facility] [place], [facility] [place]. All rights reserved. This information is not intended as a substitute for professional medical care. Always follow your healthcare professional's instructions.

## 2020-05-07 NOTE — PROGRESS NOTES
Gregory Brewer is a 46year old female. Patient presents with:  Headache  Dizziness  Neck Pain    This visit is conducted using telemedicine with live interactive video and audio. Gregory Brewer verbally consents to virtual video visit.    Patient understands today for headache, dizziness and neck pain. Diagnoses and all orders for this visit:    Neck pain  -     naproxen 500 MG Oral Tab; Take 1 tablet (500 mg total) by mouth 2 (two) times daily with meals for 15 days.     Headache in back of head    Spasm of

## 2020-05-08 NOTE — TELEPHONE ENCOUNTER
From: Gabo Whittaker  To: Naveen Sevilla MD  Sent: 5/7/2020 10:30 PM CDT  Subject: Visit Follow-up Question    Hello Doctor,    Here is my BP reading for yesterday and today.     6th May 7 am - 110/77 63 pulse  6th May 7 pm - 128/97 82 pulse    7th May 7 a

## 2020-05-11 ENCOUNTER — PATIENT MESSAGE (OUTPATIENT)
Dept: FAMILY MEDICINE CLINIC | Facility: CLINIC | Age: 40
End: 2020-05-11

## 2020-05-11 NOTE — TELEPHONE ENCOUNTER
From: Mishel Schmidt  To: Debbie Lane MD  Sent: 5/11/2020 10:25 AM CDT  Subject: Visit Follow-up Question    Hello Doctor,    Here is my BP reading for 8th, 9th and 10th May.     8th May 7 am - 128/88 76 pulse   8th May 4 pm - 126/108 72 pulse  8th May

## 2020-05-13 ENCOUNTER — PATIENT MESSAGE (OUTPATIENT)
Dept: FAMILY MEDICINE CLINIC | Facility: CLINIC | Age: 40
End: 2020-05-13

## 2020-05-13 NOTE — TELEPHONE ENCOUNTER
From: Roberts Showers  To: Jumana Burch MD  Sent: 5/13/2020 11:04 AM CDT  Subject: Visit Follow-up Question    Hello doctor,    Here are my readings for the past 2 days.     11th May 7 am - 108/80 68 pulse   11th May 6pm - 134/104 68 pulse   11th May 10:3

## 2020-05-20 ENCOUNTER — PATIENT MESSAGE (OUTPATIENT)
Dept: FAMILY MEDICINE CLINIC | Facility: CLINIC | Age: 40
End: 2020-05-20

## 2020-05-20 DIAGNOSIS — M62.838 SPASM OF MUSCLE: ICD-10-CM

## 2020-05-20 DIAGNOSIS — M54.2 NECK PAIN: ICD-10-CM

## 2020-05-20 RX ORDER — NAPROXEN 500 MG/1
500 TABLET ORAL 2 TIMES DAILY WITH MEALS
Qty: 30 TABLET | Refills: 0 | OUTPATIENT
Start: 2020-05-20 | End: 2020-06-04

## 2020-05-20 RX ORDER — CYCLOBENZAPRINE HCL 10 MG
10 TABLET ORAL NIGHTLY
Qty: 15 TABLET | Refills: 0 | OUTPATIENT
Start: 2020-05-20 | End: 2020-06-04

## 2020-05-20 NOTE — TELEPHONE ENCOUNTER
If patient's neck pain is not better have her schedule an in office appointment with me for further evaluation.

## 2020-05-20 NOTE — TELEPHONE ENCOUNTER
LOV 3/6/2020    LAST LAB    LAST RX 5-27-20 5-7-20 15 tab    Next OV No future appointments.     PROTOCOL  Name from pharmacy: CYCLOBENZAPRINE 10 MG TABLET          Will file in chart as: CYCLOBENZAPRINE 10 MG Oral Tab         Sig: Take 1 tablet (10 mg tota

## 2020-05-21 NOTE — TELEPHONE ENCOUNTER
From: Raciel Anguiano  To: Tara Monroe MD  Sent: 5/20/2020 5:30 PM CDT  Subject: Visit Follow-up Question    Hi Doctor,    Please find below my readings for BP and Pulse over the past few days. Physically I am feeling much better . . just the muscle in

## 2020-05-22 ENCOUNTER — TELEMEDICINE (OUTPATIENT)
Dept: FAMILY MEDICINE CLINIC | Facility: CLINIC | Age: 40
End: 2020-05-22

## 2020-05-22 VITALS — SYSTOLIC BLOOD PRESSURE: 124 MMHG | HEART RATE: 80 BPM | DIASTOLIC BLOOD PRESSURE: 89 MMHG

## 2020-05-22 DIAGNOSIS — I10 ESSENTIAL HYPERTENSION, BENIGN: Primary | ICD-10-CM

## 2020-05-22 DIAGNOSIS — M62.838 SPASM OF MUSCLE: ICD-10-CM

## 2020-05-22 DIAGNOSIS — M54.2 NECK PAIN: ICD-10-CM

## 2020-05-22 PROCEDURE — 99213 OFFICE O/P EST LOW 20 MIN: CPT | Performed by: FAMILY MEDICINE

## 2020-05-22 RX ORDER — LISINOPRIL 5 MG/1
5 TABLET ORAL DAILY
Qty: 30 TABLET | Refills: 0 | Status: SHIPPED | OUTPATIENT
Start: 2020-05-22 | End: 2020-06-19

## 2020-05-22 NOTE — PROGRESS NOTES
Willard Benítez is a 46year old female. Patient presents with:  Hypertension: f/u. This visit is conducted using telemedicine with live interactive video and audio. Willard Benítez verbally consents to virtual video visit.    Patient understands and accepts Pulse 80   LMP 05/06/2020   GENERAL: well developed, well nourished,in no apparent distress  HEENT: atraumatic, normocephalic  NECK: NORMAL rom  LUNGS: act of breathing is normal  CARDIO: speaking in full sentences  EXTREMITIES: no edema    ASSESSMENT AND

## 2020-06-04 RX ORDER — ERGOCALCIFEROL 1.25 MG/1
CAPSULE ORAL
Qty: 12 CAPSULE | Refills: 0 | OUTPATIENT
Start: 2020-06-04

## 2020-06-15 DIAGNOSIS — I10 ESSENTIAL HYPERTENSION, BENIGN: ICD-10-CM

## 2020-06-15 NOTE — TELEPHONE ENCOUNTER
LOV 3/6/2020    LAST LAB 3-13-20    LAST RX 5-22-20 30*0    Next OV   Future Appointments   Date Time Provider Manjula Scarlet   6/19/2020  1:00 PM Clinton Morin MD [facility]         PROTOCOL    Name from pharmacy: LISINOPRIL 5 MG TABLET

## 2020-06-16 RX ORDER — LISINOPRIL 5 MG/1
TABLET ORAL
Qty: 30 TABLET | Refills: 0 | OUTPATIENT
Start: 2020-06-16

## 2020-06-19 ENCOUNTER — OFFICE VISIT (OUTPATIENT)
Dept: FAMILY MEDICINE CLINIC | Facility: CLINIC | Age: 40
End: 2020-06-19
Payer: COMMERCIAL

## 2020-06-19 VITALS
TEMPERATURE: 98 F | HEIGHT: 61 IN | BODY MASS INDEX: 24.55 KG/M2 | RESPIRATION RATE: 18 BRPM | SYSTOLIC BLOOD PRESSURE: 112 MMHG | OXYGEN SATURATION: 98 % | DIASTOLIC BLOOD PRESSURE: 90 MMHG | HEART RATE: 96 BPM | WEIGHT: 130 LBS

## 2020-06-19 DIAGNOSIS — I10 ESSENTIAL HYPERTENSION, BENIGN: ICD-10-CM

## 2020-06-19 PROCEDURE — 99213 OFFICE O/P EST LOW 20 MIN: CPT | Performed by: FAMILY MEDICINE

## 2020-06-19 RX ORDER — LISINOPRIL 5 MG/1
5 TABLET ORAL DAILY
Qty: 30 TABLET | Refills: 0 | Status: CANCELLED | OUTPATIENT
Start: 2020-06-19 | End: 2020-07-19

## 2020-06-19 RX ORDER — AMLODIPINE BESYLATE 5 MG/1
5 TABLET ORAL DAILY
Qty: 90 TABLET | Refills: 0 | Status: SHIPPED | OUTPATIENT
Start: 2020-06-19 | End: 2020-09-24

## 2020-06-19 NOTE — PROGRESS NOTES
Laurie Conklin is a 46year old female. Patient presents with:  Hypertension    HPI:   Laurie Conklin is a 46year old female with history of hypertension seen for follow-up and medication refill.   Patient states blood pressure has been well controlled and he today for hypertension. Diagnoses and all orders for this visit:    Essential hypertension, benign controlled  -     amLODIPine Besylate 5 MG Oral Tab; Take 1 tablet (5 mg total) by mouth daily.     Lisinopril discontinued due to side effect of cough and

## 2020-06-23 ENCOUNTER — TELEPHONE (OUTPATIENT)
Dept: FAMILY MEDICINE CLINIC | Facility: CLINIC | Age: 40
End: 2020-06-23

## 2020-06-23 NOTE — TELEPHONE ENCOUNTER
Sent UIBLUEPRINT message to patient to call and schedule appointment           Good morning Jazzy Peñaloza, per dr request you are due to be seen by 09/19/20 , please call the office to schedule     [phone] M-F 08:30-4pm     Thank you

## 2020-09-10 DIAGNOSIS — I10 ESSENTIAL HYPERTENSION, BENIGN: ICD-10-CM

## 2020-09-11 NOTE — TELEPHONE ENCOUNTER
Hypertension Medications Protocol Passed9/10 12:11 AM   CMP or BMP in past 12 months    Last serum creatinine< 2.0    Appointment in past 6 or next 3 months     LOV 6/19/20      LAST LAB  3/13/20      LAST RX  6/19/20 90 tabs 0 refills     Next OV No futur

## 2020-09-23 ENCOUNTER — PATIENT MESSAGE (OUTPATIENT)
Dept: FAMILY MEDICINE CLINIC | Facility: CLINIC | Age: 40
End: 2020-09-23

## 2020-09-24 ENCOUNTER — TELEMEDICINE (OUTPATIENT)
Dept: FAMILY MEDICINE CLINIC | Facility: CLINIC | Age: 40
End: 2020-09-24

## 2020-09-24 VITALS — SYSTOLIC BLOOD PRESSURE: 124 MMHG | DIASTOLIC BLOOD PRESSURE: 84 MMHG

## 2020-09-24 DIAGNOSIS — I10 ESSENTIAL HYPERTENSION, BENIGN: Primary | ICD-10-CM

## 2020-09-24 PROCEDURE — 3079F DIAST BP 80-89 MM HG: CPT | Performed by: FAMILY MEDICINE

## 2020-09-24 PROCEDURE — 3074F SYST BP LT 130 MM HG: CPT | Performed by: FAMILY MEDICINE

## 2020-09-24 PROCEDURE — 99213 OFFICE O/P EST LOW 20 MIN: CPT | Performed by: FAMILY MEDICINE

## 2020-09-24 RX ORDER — AMLODIPINE BESYLATE 5 MG/1
5 TABLET ORAL DAILY
Qty: 90 TABLET | Refills: 1 | Status: SHIPPED | OUTPATIENT
Start: 2020-09-24 | End: 2021-03-23

## 2020-09-24 NOTE — PROGRESS NOTES
Syeda Alonso is a 45year old female. No chief complaint on file. This visit is conducted using telemedicine with live interactive video and audio. Hallie hernandez consents to virtual video visit.    Patient understands and accepts financial respon hypertension, benign controlled  -     amLODIPine Besylate 5 MG Oral Tab; Take 1 tablet (5 mg total) by mouth daily. Please note that the following visit was completed using two-way, real-time interactive audio and/or video communication.   This has been

## 2020-09-25 RX ORDER — AMLODIPINE BESYLATE 5 MG/1
TABLET ORAL
Qty: 90 TABLET | Refills: 0 | OUTPATIENT
Start: 2020-09-25

## 2020-12-09 NOTE — Clinical Note
I saw Eulalio Deuce in the [facility] today for Eustachian tube dysfunction, right  (primary encounter diagnosis). she was treated with otc and comfort care. Eulalio Tripathi will follow up with you if no better or as needed.  Thank you for the opportunit Solaraze Counseling:  I discussed with the patient the risks of Solaraze including but not limited to erythema, scaling, itching, weeping, crusting, and pain.

## 2021-03-20 DIAGNOSIS — I10 ESSENTIAL HYPERTENSION, BENIGN: ICD-10-CM

## 2021-03-22 NOTE — TELEPHONE ENCOUNTER
Hypertension Medications Protocol [id number]/20/2021 09:11 AM   CMP or BMP in past 12 months Protocol Details    Appointment in past 6 or next 3 months     Last serum creatinine< 2.0        Last visit 9/24/20    Last labs 3/13/20    Last refill  amLODIPine B

## 2021-03-23 RX ORDER — AMLODIPINE BESYLATE 5 MG/1
TABLET ORAL
Qty: 30 TABLET | Refills: 0 | Status: SHIPPED | OUTPATIENT
Start: 2021-03-23 | End: 2021-03-26

## 2021-03-26 ENCOUNTER — TELEMEDICINE (OUTPATIENT)
Dept: FAMILY MEDICINE CLINIC | Facility: CLINIC | Age: 41
End: 2021-03-26
Payer: COMMERCIAL

## 2021-03-26 VITALS
DIASTOLIC BLOOD PRESSURE: 90 MMHG | HEART RATE: 80 BPM | SYSTOLIC BLOOD PRESSURE: 125 MMHG | BODY MASS INDEX: 25 KG/M2 | WEIGHT: 131 LBS

## 2021-03-26 DIAGNOSIS — M25.541 ARTHRALGIA OF RIGHT HAND: ICD-10-CM

## 2021-03-26 DIAGNOSIS — I10 ESSENTIAL HYPERTENSION, BENIGN: Primary | ICD-10-CM

## 2021-03-26 PROCEDURE — 99213 OFFICE O/P EST LOW 20 MIN: CPT | Performed by: FAMILY MEDICINE

## 2021-03-26 PROCEDURE — 3080F DIAST BP >= 90 MM HG: CPT | Performed by: FAMILY MEDICINE

## 2021-03-26 PROCEDURE — 3074F SYST BP LT 130 MM HG: CPT | Performed by: FAMILY MEDICINE

## 2021-03-26 RX ORDER — AMLODIPINE BESYLATE 5 MG/1
5 TABLET ORAL DAILY
Qty: 90 TABLET | Refills: 0 | Status: SHIPPED | OUTPATIENT
Start: 2021-03-26 | End: 2021-05-17

## 2021-03-26 NOTE — PROGRESS NOTES
Kevin Dickey is a 45year old female. Patient presents with:  Medication Follow-Up: HTN    This visit is conducted using telemedicine with live interactive video and audio. Kevin Dickey verbally consents to virtual video visit.    Patient understands and was seen today for medication follow-up. Diagnoses and all orders for this visit:    Essential hypertension, benign well controlled  -     amLODIPine Besylate 5 MG Oral Tab; Take 1 tablet (5 mg total) by mouth daily.     Arthralgia of right hand  Advised

## 2021-05-17 ENCOUNTER — OFFICE VISIT (OUTPATIENT)
Dept: FAMILY MEDICINE CLINIC | Facility: CLINIC | Age: 41
End: 2021-05-17
Payer: COMMERCIAL

## 2021-05-17 DIAGNOSIS — I10 ESSENTIAL HYPERTENSION, BENIGN: ICD-10-CM

## 2021-05-17 DIAGNOSIS — Z00.00 ROUTINE GENERAL MEDICAL EXAMINATION AT A HEALTH CARE FACILITY: Primary | ICD-10-CM

## 2021-05-17 DIAGNOSIS — B07.9 VIRAL WART ON FINGER: ICD-10-CM

## 2021-05-17 DIAGNOSIS — E55.9 VITAMIN D DEFICIENCY: ICD-10-CM

## 2021-05-17 PROCEDURE — 3078F DIAST BP <80 MM HG: CPT | Performed by: FAMILY MEDICINE

## 2021-05-17 PROCEDURE — 3074F SYST BP LT 130 MM HG: CPT | Performed by: FAMILY MEDICINE

## 2021-05-17 PROCEDURE — 3008F BODY MASS INDEX DOCD: CPT | Performed by: FAMILY MEDICINE

## 2021-05-17 PROCEDURE — 99395 PREV VISIT EST AGE 18-39: CPT | Performed by: FAMILY MEDICINE

## 2021-05-17 RX ORDER — AMLODIPINE BESYLATE 5 MG/1
5 TABLET ORAL DAILY
Qty: 90 TABLET | Refills: 0 | Status: SHIPPED | OUTPATIENT
Start: 2021-05-17 | End: 2021-09-14

## 2021-05-17 NOTE — PATIENT INSTRUCTIONS
Please try taking half a tablet start (2.5 mg) of amlodipine daily and continue to monitor your blood pressure. Prevention Guidelines, Women Ages 25 to 44  Screening tests and vaccines are an important part of managing your health.  A screening test is the 24th week.     Gonorrhea Sexually active women at increased risk for infection  At routine exams   Hepatitis C Anyone at increased risk  At routine exams   HIV All women At routine exams3     Obesity All women in this age group  At routine exams   Syph Women at increased risk for infection should talk with their healthcare provider  PCV13: 1 dose ages 23 to 72 (protects against 13 types of pneumococcal bacteria)   PPSV23: 1 to 2 doses through age 59, or 1 dose at 72 or older (protects against 23 types of educational content on 10/1/2017  © 4620-2655 [employer]. All rights reserved. This information is not intended as a substitute for professional medical care. Always follow your healthcare professional's instructions.         Treating Warts can vaporize wart tissue or destroy the blood vessels that feed the wart. This is done in the provider's office. · Shots (injections).  These can be used to treat warts that don’t respond to other treatments, such as stubborn or painful warts around the na

## 2021-05-17 NOTE — PROGRESS NOTES
Santosh Wheeler is a 45year old female.   Patient presents with:  Physical    HPI:   Santosh Wheeler is a 45year old female with history of hypertension seen for annual physical.  Pap smear done in 2019 was normal.  Does do breast self-exam, no family history o sore throat. Eyes: Negative for discharge, redness and visual disturbance. Respiratory: Negative for cough, chest tightness and shortness of breath. Cardiovascular: Negative for chest pain and palpitations.    Gastrointestinal: Negative for abdomina Breast: texture dense, no skin changes, no nipple retraction or discharge, no palpable lump or mass, no axillary lymphadenopathy. Chest:      Chest wall: No tenderness. Abdominal:      General: Bowel sounds are normal. There is no distension.       Nima Vivas D, 25-HYDROXY    Viral wart on finger  Advised to try over the counter compound W.

## 2021-05-18 VITALS
OXYGEN SATURATION: 98 % | HEART RATE: 98 BPM | SYSTOLIC BLOOD PRESSURE: 120 MMHG | HEIGHT: 61 IN | BODY MASS INDEX: 25.86 KG/M2 | RESPIRATION RATE: 18 BRPM | DIASTOLIC BLOOD PRESSURE: 76 MMHG | WEIGHT: 137 LBS | TEMPERATURE: 98 F

## 2021-09-07 DIAGNOSIS — E55.9 VITAMIN D DEFICIENCY: ICD-10-CM

## 2021-09-07 RX ORDER — ERGOCALCIFEROL 1.25 MG/1
CAPSULE ORAL
Qty: 12 CAPSULE | Refills: 0 | OUTPATIENT
Start: 2021-09-07

## 2021-09-12 DIAGNOSIS — I10 ESSENTIAL HYPERTENSION, BENIGN: ICD-10-CM

## 2021-09-14 RX ORDER — AMLODIPINE BESYLATE 5 MG/1
TABLET ORAL
Qty: 90 TABLET | Refills: 0 | Status: SHIPPED | OUTPATIENT
Start: 2021-09-14 | End: 2021-12-22

## 2021-09-14 NOTE — TELEPHONE ENCOUNTER
LOV 5/17/2021    LAST LAB    LAST RX 5-17-21 90*0    Next OV No future appointments.     PROTOCOL  Name from pharmacy: AMLODIPINE BESYLATE 5 MG TAB          Will file in chart as: AMLODIPINE 5 MG Oral Tab    Sig: TAKE 1 TABLET BY MOUTH EVERY DAY    Disp:  9

## 2021-11-16 LAB
AMB EXT BLOOD URINE: NEGATIVE
AMB EXT GLUCOSE URINE: NEGATIVE
AMB EXT HEMATOCRIT: 42.1
AMB EXT HEMOGLOBIN: 13.8 (ref 11–16)
AMB EXT HGBA1C: 5.3 %
AMB EXT KETONES URINE: NEGATIVE
AMB EXT LEUKOCYTE ESTERASE URINE: NEGATIVE
AMB EXT MCV: 91.9
AMB EXT NITRITE URINE: NEGATIVE
AMB EXT PH URINE: 6
AMB EXT PLATELETS: 388
AMB EXT PROTEIN URINE: NEGATIVE
AMB EXT TSH: 3.04 MIU/ML
AMB EXT URINE CLARITY: CLEAR
AMB EXT URINE COLOR: YELLOW
AMB EXT URINE SPECIFIC GRAVITY: 1
AMB EXT UROBILINOGEN URINE: 0.2

## 2021-12-19 DIAGNOSIS — I10 ESSENTIAL HYPERTENSION, BENIGN: ICD-10-CM

## 2021-12-21 NOTE — TELEPHONE ENCOUNTER
LOV 5/17/2021    LAST LAB 6-17-21     LAST RX 9-14-21 90*0    Next OV No future appointments.     PROTOCOL  Name from pharmacy: AMLODIPINE BESYLATE 5 MG TAB          Will file in chart as: AMLODIPINE 5 MG Oral Tab    Sig: TAKE 1 TABLET BY MOUTH EVERY DAY

## 2021-12-22 RX ORDER — AMLODIPINE BESYLATE 5 MG/1
TABLET ORAL
Qty: 30 TABLET | Refills: 0 | Status: SHIPPED | OUTPATIENT
Start: 2021-12-22 | End: 2022-01-25

## 2022-01-25 DIAGNOSIS — I10 ESSENTIAL HYPERTENSION, BENIGN: ICD-10-CM

## 2022-01-25 RX ORDER — AMLODIPINE BESYLATE 5 MG/1
TABLET ORAL
Qty: 30 TABLET | Refills: 0 | Status: SHIPPED | OUTPATIENT
Start: 2022-01-25

## 2022-01-25 NOTE — TELEPHONE ENCOUNTER
LOV 5/17/2021    LAST LAB    LAST RX 12-22-21 30*0    Next OV   Future Appointments   Date Time Provider Manjula Novak   2/11/2022  1:40 PM Socorro Rico MD [facility]         PROTOCOL  Name from pharmacy: AMLODIPINE BESYLATE 5 MG TAB

## 2022-02-09 ENCOUNTER — PATIENT MESSAGE (OUTPATIENT)
Dept: FAMILY MEDICINE CLINIC | Facility: CLINIC | Age: 42
End: 2022-02-09

## 2022-02-10 NOTE — TELEPHONE ENCOUNTER
LOV 5/17/21    Future Appointments   Date Time Provider Manjula Scarlet   2/11/2022 10:20 AM Aquilino Peña MD [facility]     Lab results printed and placed in PCP bin to review with pt at scheduled OV. Thank you. MALINDA

## 2022-02-11 ENCOUNTER — TELEPHONE (OUTPATIENT)
Dept: FAMILY MEDICINE CLINIC | Facility: CLINIC | Age: 42
End: 2022-02-11

## 2022-02-11 ENCOUNTER — TELEMEDICINE (OUTPATIENT)
Dept: FAMILY MEDICINE CLINIC | Facility: CLINIC | Age: 42
End: 2022-02-11

## 2022-02-11 VITALS — DIASTOLIC BLOOD PRESSURE: 82 MMHG | SYSTOLIC BLOOD PRESSURE: 125 MMHG

## 2022-02-11 DIAGNOSIS — I10 ESSENTIAL HYPERTENSION, BENIGN: ICD-10-CM

## 2022-02-11 PROCEDURE — 3074F SYST BP LT 130 MM HG: CPT | Performed by: FAMILY MEDICINE

## 2022-02-11 PROCEDURE — 3079F DIAST BP 80-89 MM HG: CPT | Performed by: FAMILY MEDICINE

## 2022-02-11 PROCEDURE — 99213 OFFICE O/P EST LOW 20 MIN: CPT | Performed by: FAMILY MEDICINE

## 2022-02-11 RX ORDER — AMLODIPINE BESYLATE 5 MG/1
5 TABLET ORAL DAILY
Qty: 90 TABLET | Refills: 1 | Status: SHIPPED | OUTPATIENT
Start: 2022-02-11

## 2022-03-15 ENCOUNTER — OFFICE VISIT (OUTPATIENT)
Dept: FAMILY MEDICINE CLINIC | Facility: CLINIC | Age: 42
End: 2022-03-15
Payer: COMMERCIAL

## 2022-03-15 VITALS
RESPIRATION RATE: 18 BRPM | HEART RATE: 88 BPM | HEIGHT: 62 IN | DIASTOLIC BLOOD PRESSURE: 80 MMHG | WEIGHT: 130 LBS | OXYGEN SATURATION: 99 % | TEMPERATURE: 98 F | SYSTOLIC BLOOD PRESSURE: 130 MMHG | BODY MASS INDEX: 23.92 KG/M2

## 2022-03-15 DIAGNOSIS — J30.2 SEASONAL ALLERGIES: Primary | ICD-10-CM

## 2022-03-15 PROCEDURE — 3079F DIAST BP 80-89 MM HG: CPT | Performed by: NURSE PRACTITIONER

## 2022-03-15 PROCEDURE — 99213 OFFICE O/P EST LOW 20 MIN: CPT | Performed by: NURSE PRACTITIONER

## 2022-03-15 PROCEDURE — 3075F SYST BP GE 130 - 139MM HG: CPT | Performed by: NURSE PRACTITIONER

## 2022-03-15 PROCEDURE — 3008F BODY MASS INDEX DOCD: CPT | Performed by: NURSE PRACTITIONER

## 2022-08-27 DIAGNOSIS — I10 ESSENTIAL HYPERTENSION, BENIGN: ICD-10-CM

## 2022-08-29 RX ORDER — AMLODIPINE BESYLATE 5 MG/1
5 TABLET ORAL DAILY
Qty: 90 TABLET | Refills: 0 | Status: SHIPPED | OUTPATIENT
Start: 2022-08-29

## 2022-08-29 NOTE — TELEPHONE ENCOUNTER
Hypertension Medications Protocol Failed 08/27/2022 07:49 AM   Protocol Details  CMP or BMP in past 12 months    Last serum creatinine< 2.0    Appointment in past 6 or next 3 months        LOV  2/11/22 video     LAST LAB   N/a     LAST RX 2/11/22 90 with 1     Next OV   Future Appointments   Date Time Provider Manjula Novak   9/8/2022 11:20 AM Ari Smith MD [facility]         PROTOCOL failed

## 2022-09-08 ENCOUNTER — OFFICE VISIT (OUTPATIENT)
Dept: FAMILY MEDICINE CLINIC | Facility: CLINIC | Age: 42
End: 2022-09-08
Payer: COMMERCIAL

## 2022-09-08 VITALS
HEART RATE: 76 BPM | RESPIRATION RATE: 18 BRPM | SYSTOLIC BLOOD PRESSURE: 110 MMHG | BODY MASS INDEX: 25.95 KG/M2 | OXYGEN SATURATION: 98 % | TEMPERATURE: 98 F | DIASTOLIC BLOOD PRESSURE: 68 MMHG | WEIGHT: 141 LBS | HEIGHT: 62 IN

## 2022-09-08 DIAGNOSIS — B07.9 VERRUCA: ICD-10-CM

## 2022-09-08 DIAGNOSIS — Z00.00 ROUTINE GENERAL MEDICAL EXAMINATION AT A HEALTH CARE FACILITY: Primary | ICD-10-CM

## 2022-09-08 DIAGNOSIS — Z12.31 ENCOUNTER FOR SCREENING MAMMOGRAM FOR MALIGNANT NEOPLASM OF BREAST: ICD-10-CM

## 2022-09-08 DIAGNOSIS — E55.9 VITAMIN D DEFICIENCY: ICD-10-CM

## 2022-09-08 DIAGNOSIS — I10 ESSENTIAL HYPERTENSION, BENIGN: ICD-10-CM

## 2022-09-08 DIAGNOSIS — Z01.419 ENCOUNTER FOR ROUTINE GYNECOLOGICAL EXAMINATION WITH PAPANICOLAOU SMEAR OF CERVIX: ICD-10-CM

## 2022-09-08 PROCEDURE — 3074F SYST BP LT 130 MM HG: CPT | Performed by: FAMILY MEDICINE

## 2022-09-08 PROCEDURE — 99213 OFFICE O/P EST LOW 20 MIN: CPT | Performed by: FAMILY MEDICINE

## 2022-09-08 PROCEDURE — 17110 DESTRUCTION B9 LES UP TO 14: CPT | Performed by: FAMILY MEDICINE

## 2022-09-08 PROCEDURE — 87624 HPV HI-RISK TYP POOLED RSLT: CPT | Performed by: FAMILY MEDICINE

## 2022-09-08 PROCEDURE — 3008F BODY MASS INDEX DOCD: CPT | Performed by: FAMILY MEDICINE

## 2022-09-08 PROCEDURE — 3078F DIAST BP <80 MM HG: CPT | Performed by: FAMILY MEDICINE

## 2022-09-08 PROCEDURE — 99396 PREV VISIT EST AGE 40-64: CPT | Performed by: FAMILY MEDICINE

## 2022-09-08 RX ORDER — AMLODIPINE BESYLATE 5 MG/1
5 TABLET ORAL DAILY
Qty: 90 TABLET | Refills: 0 | Status: SHIPPED | OUTPATIENT
Start: 2022-09-08

## 2022-09-09 LAB — HPV I/H RISK 1 DNA SPEC QL NAA+PROBE: NEGATIVE

## 2022-09-13 ENCOUNTER — HOSPITAL ENCOUNTER (OUTPATIENT)
Dept: MAMMOGRAPHY | Facility: HOSPITAL | Age: 42
Discharge: HOME | End: 2022-09-13
Attending: FAMILY MEDICINE
Payer: COMMERCIAL

## 2022-09-13 DIAGNOSIS — Z12.31 ENCOUNTER FOR SCREENING MAMMOGRAM FOR MALIGNANT NEOPLASM OF BREAST: ICD-10-CM

## 2022-09-13 PROCEDURE — 77063 BREAST TOMOSYNTHESIS BI: CPT | Performed by: FAMILY MEDICINE

## 2022-09-13 PROCEDURE — 77067 SCR MAMMO BI INCL CAD: CPT | Performed by: FAMILY MEDICINE

## 2022-09-22 LAB
LAST PAP RESULT: NORMAL
PAP HISTORY (OTHER THAN LAST PAP): NORMAL

## 2022-10-01 LAB
ABSOLUTE BASOPHILS: 32 CELLS/UL (ref 0–200)
ABSOLUTE EOSINOPHILS: 192 CELLS/UL (ref 15–500)
ABSOLUTE LYMPHOCYTES: 2032 CELLS/UL (ref 850–3900)
ABSOLUTE MONOCYTES: 520 CELLS/UL (ref 200–950)
ABSOLUTE NEUTROPHILS: 5224 CELLS/UL (ref 1500–7800)
ALBUMIN/GLOBULIN RATIO: 1.2 (CALC) (ref 1–2.5)
ALBUMIN: 3.9 G/DL (ref 3.6–5.1)
ALKALINE PHOSPHATASE: 80 U/L (ref 31–125)
ALT: 17 U/L (ref 6–29)
AST: 17 U/L (ref 10–30)
BASOPHILS: 0.4 %
BILIRUBIN, TOTAL: 0.6 MG/DL (ref 0.2–1.2)
BUN: 10 MG/DL (ref 7–25)
CALCIUM: 9.1 MG/DL (ref 8.6–10.2)
CARBON DIOXIDE: 27 MMOL/L (ref 20–32)
CHLORIDE: 103 MMOL/L (ref 98–110)
CHOL/HDLC RATIO: 5 (CALC)
CHOLESTEROL, TOTAL: 198 MG/DL
CREATININE: 0.85 MG/DL (ref 0.5–0.99)
EGFR: 88 ML/MIN/1.73M2
EOSINOPHILS: 2.4 %
GLOBULIN: 3.2 G/DL (CALC) (ref 1.9–3.7)
GLUCOSE: 79 MG/DL (ref 65–99)
HDL CHOLESTEROL: 40 MG/DL
HEMATOCRIT: 41.3 % (ref 35–45)
HEMOGLOBIN: 13.7 G/DL (ref 11.7–15.5)
LDL-CHOLESTEROL: 127 MG/DL (CALC)
LYMPHOCYTES: 25.4 %
MCH: 30.6 PG (ref 27–33)
MCHC: 33.2 G/DL (ref 32–36)
MCV: 92.4 FL (ref 80–100)
MONOCYTES: 6.5 %
MPV: 11 FL (ref 7.5–12.5)
NEUTROPHILS: 65.3 %
NON-HDL CHOLESTEROL: 158 MG/DL (CALC)
PLATELET COUNT: 290 THOUSAND/UL (ref 140–400)
POTASSIUM: 4.5 MMOL/L (ref 3.5–5.3)
PROTEIN, TOTAL: 7.1 G/DL (ref 6.1–8.1)
RDW: 12.2 % (ref 11–15)
RED BLOOD CELL COUNT: 4.47 MILLION/UL (ref 3.8–5.1)
SODIUM: 137 MMOL/L (ref 135–146)
TRIGLYCERIDES: 191 MG/DL
TSH: 4.4 MIU/L
VITAMIN D, 25-OH, TOTAL: 25 NG/ML (ref 30–100)
WHITE BLOOD CELL COUNT: 8 THOUSAND/UL (ref 3.8–10.8)

## 2022-11-17 ENCOUNTER — TELEPHONE (OUTPATIENT)
Dept: FAMILY MEDICINE CLINIC | Facility: CLINIC | Age: 42
End: 2022-11-17

## 2022-11-17 NOTE — TELEPHONE ENCOUNTER
Called patient - name and  verified. She was informed that I am calling to address her concern regarding her visit with Dr. Luis Mcgowan on 2022. Dr. Luis Mcgowan - patient is asking if the verruca can be switched to wart as it is minor. Please advise. She was asking why she was charged $290 for something common as wart and if it would be part of well visit. She was advised to reach out to insurance to clarify what is part of well visit and what is not. The charge is not just specially for the Verruca treatment. It is for the entire visit and any treatment completed. She asked why was she not informed about the charge prior to treatment. She was advised that providers are not aware of charges or what the insurance covers. That is why they inform you and ask for permission to treat your condition. If you were not sure, you could deny the treatment and confirm with insurance prior to receiving treatment with provider. She was also informed that she was informed by provider to follow-up for another treatment if it is not resolved in 2 weeks. She asked if the $290 charge would include the follow-up visit. She was informed no, since we only bill for the appointment and treatment done at the visit. For further clarification on the bill to reach out to insurance.

## 2022-11-18 NOTE — TELEPHONE ENCOUNTER
Office visit from 09/08/2022 was addendum per Dr. Álvaro Espinosa and changed dx from Verruca to wart on thumb. Called patient and notified the changed in diagnosis as she had requested. She will reach out to her insurance and if they need updated notes, she will reach out to us with a fax number.

## 2023-02-25 DIAGNOSIS — I10 ESSENTIAL HYPERTENSION, BENIGN: ICD-10-CM

## 2023-02-25 RX ORDER — AMLODIPINE BESYLATE 5 MG/1
5 TABLET ORAL DAILY
Qty: 90 TABLET | Refills: 0 | Status: SHIPPED | OUTPATIENT
Start: 2023-02-25

## 2023-06-01 DIAGNOSIS — I10 ESSENTIAL HYPERTENSION, BENIGN: ICD-10-CM

## 2023-06-01 NOTE — TELEPHONE ENCOUNTER
Hypertension Medications Protocol Failed 06/01/2023 02:09 AM   Protocol Details  Appointment in past 6 or next 3 months    CMP or BMP in past 12 months    Last serum creatinine< 2.0        LOV 9/8/22 dr galloway     LAST LAB  9/30/22     LAST RX 2/25/23 90     Next OV No future appointments.       PROTOCOL failed

## 2023-06-02 RX ORDER — AMLODIPINE BESYLATE 5 MG/1
5 TABLET ORAL DAILY
Qty: 30 TABLET | Refills: 0 | Status: SHIPPED | OUTPATIENT
Start: 2023-06-02

## 2023-07-05 DIAGNOSIS — I10 ESSENTIAL HYPERTENSION, BENIGN: ICD-10-CM

## 2023-07-05 NOTE — TELEPHONE ENCOUNTER
Hypertension Medications Protocol [id number]/05/2023 10:32 AM   Protocol Details Appointment in past 6 or next 3 months    CMP or BMP in past 12 months    Last serum creatinine< 2.0          LOV  9/8/22 dr Cliff Li     LAST LAB n/a      LAST RX   6/2/23 30     Next OV No future appointments.       PROTOCOL failed       Due for physical

## 2023-07-07 RX ORDER — AMLODIPINE BESYLATE 5 MG/1
5 TABLET ORAL DAILY
Qty: 30 TABLET | Refills: 0 | Status: SHIPPED | OUTPATIENT
Start: 2023-07-07

## 2023-08-11 DIAGNOSIS — I10 ESSENTIAL HYPERTENSION, BENIGN: ICD-10-CM

## 2023-08-11 RX ORDER — AMLODIPINE BESYLATE 5 MG/1
5 TABLET ORAL DAILY
Qty: 30 TABLET | Refills: 0 | Status: SHIPPED | OUTPATIENT
Start: 2023-08-11 | End: 2023-08-14

## 2023-08-11 NOTE — TELEPHONE ENCOUNTER
Hypertension Medications Protocol [id number]/11/2023 08:34 AM   Protocol Details CMP or BMP in past 12 months    Last serum creatinine< 2.0    Appointment in past 6 or next 3 months        LOV 9/8/22 dr galloway     LAST LAB  10/1/22    LAST RX  7/7/23 30      Next OV   Future Appointments   Date Time Provider Manjula Novak   10/9/2023 10:20 AM Carrie Pablo MD [facility]         PROTOCOL pass

## 2023-08-14 ENCOUNTER — OFFICE VISIT (OUTPATIENT)
Dept: FAMILY MEDICINE CLINIC | Facility: CLINIC | Age: 43
End: 2023-08-14
Payer: COMMERCIAL

## 2023-08-14 VITALS
SYSTOLIC BLOOD PRESSURE: 110 MMHG | TEMPERATURE: 98 F | WEIGHT: 147 LBS | RESPIRATION RATE: 18 BRPM | DIASTOLIC BLOOD PRESSURE: 80 MMHG | HEART RATE: 78 BPM | BODY MASS INDEX: 27.05 KG/M2 | OXYGEN SATURATION: 98 % | HEIGHT: 62 IN

## 2023-08-14 DIAGNOSIS — M77.11 LATERAL EPICONDYLITIS OF RIGHT ELBOW: Primary | ICD-10-CM

## 2023-08-14 DIAGNOSIS — M79.641 PAIN OF RIGHT HAND: ICD-10-CM

## 2023-08-14 DIAGNOSIS — M67.441 GANGLION CYST OF TENDON SHEATH OF RIGHT HAND: ICD-10-CM

## 2023-08-14 DIAGNOSIS — I10 ESSENTIAL HYPERTENSION, BENIGN: ICD-10-CM

## 2023-08-14 PROCEDURE — 3074F SYST BP LT 130 MM HG: CPT | Performed by: FAMILY MEDICINE

## 2023-08-14 PROCEDURE — 99213 OFFICE O/P EST LOW 20 MIN: CPT | Performed by: FAMILY MEDICINE

## 2023-08-14 PROCEDURE — 3008F BODY MASS INDEX DOCD: CPT | Performed by: FAMILY MEDICINE

## 2023-08-14 PROCEDURE — 3079F DIAST BP 80-89 MM HG: CPT | Performed by: FAMILY MEDICINE

## 2023-08-14 RX ORDER — NAPROXEN 500 MG/1
500 TABLET ORAL 2 TIMES DAILY WITH MEALS
Qty: 20 TABLET | Refills: 0 | Status: SHIPPED | OUTPATIENT
Start: 2023-08-14 | End: 2023-08-24

## 2023-08-14 RX ORDER — AMLODIPINE BESYLATE 5 MG/1
5 TABLET ORAL DAILY
Qty: 90 TABLET | Refills: 1 | Status: SHIPPED | OUTPATIENT
Start: 2023-08-14

## 2023-09-19 ENCOUNTER — PATIENT MESSAGE (OUTPATIENT)
Dept: FAMILY MEDICINE CLINIC | Facility: CLINIC | Age: 43
End: 2023-09-19

## 2023-09-20 NOTE — TELEPHONE ENCOUNTER
From: Dahiana Perkins  To: Brigette House  Sent: 9/19/2023 8:56 AM CDT  Subject: Tennis Elbow follow up    Hello Doctor,    My pain killer medication is over for the tennis elbow. However, I still get pain and I do not much strength in my right hand. Please advise.     Angelito Harding

## 2023-10-09 ENCOUNTER — OFFICE VISIT (OUTPATIENT)
Dept: FAMILY MEDICINE CLINIC | Facility: CLINIC | Age: 43
End: 2023-10-09
Payer: COMMERCIAL

## 2023-10-09 VITALS
HEART RATE: 87 BPM | DIASTOLIC BLOOD PRESSURE: 78 MMHG | HEIGHT: 62 IN | SYSTOLIC BLOOD PRESSURE: 110 MMHG | WEIGHT: 148 LBS | OXYGEN SATURATION: 98 % | RESPIRATION RATE: 18 BRPM | BODY MASS INDEX: 27.23 KG/M2 | TEMPERATURE: 98 F

## 2023-10-09 DIAGNOSIS — Z12.31 VISIT FOR SCREENING MAMMOGRAM: ICD-10-CM

## 2023-10-09 DIAGNOSIS — E55.9 VITAMIN D DEFICIENCY: ICD-10-CM

## 2023-10-09 DIAGNOSIS — Z78.9 VEGETARIAN DIET: ICD-10-CM

## 2023-10-09 DIAGNOSIS — Z00.00 ROUTINE GENERAL MEDICAL EXAMINATION AT A HEALTH CARE FACILITY: Primary | ICD-10-CM

## 2023-10-09 PROCEDURE — 3078F DIAST BP <80 MM HG: CPT | Performed by: FAMILY MEDICINE

## 2023-10-09 PROCEDURE — 3074F SYST BP LT 130 MM HG: CPT | Performed by: FAMILY MEDICINE

## 2023-10-09 PROCEDURE — 99396 PREV VISIT EST AGE 40-64: CPT | Performed by: FAMILY MEDICINE

## 2023-10-09 PROCEDURE — 3008F BODY MASS INDEX DOCD: CPT | Performed by: FAMILY MEDICINE

## 2023-10-28 LAB
ABSOLUTE BASOPHILS: 50 CELLS/UL (ref 0–200)
ABSOLUTE EOSINOPHILS: 213 CELLS/UL (ref 15–500)
ABSOLUTE LYMPHOCYTES: 1931 CELLS/UL (ref 850–3900)
ABSOLUTE MONOCYTES: 419 CELLS/UL (ref 200–950)
ABSOLUTE NEUTROPHILS: 4487 CELLS/UL (ref 1500–7800)
ALBUMIN/GLOBULIN RATIO: 1.1 (CALC) (ref 1–2.5)
ALBUMIN: 4 G/DL (ref 3.6–5.1)
ALKALINE PHOSPHATASE: 81 U/L (ref 31–125)
ALT: 12 U/L (ref 6–29)
AST: 13 U/L (ref 10–30)
BASOPHILS: 0.7 %
BILIRUBIN, TOTAL: 0.3 MG/DL (ref 0.2–1.2)
BUN: 10 MG/DL (ref 7–25)
CALCIUM: 9.1 MG/DL (ref 8.6–10.2)
CARBON DIOXIDE: 26 MMOL/L (ref 20–32)
CHLORIDE: 106 MMOL/L (ref 98–110)
CHOL/HDLC RATIO: 5.5 (CALC)
CHOLESTEROL, TOTAL: 221 MG/DL
CREATININE: 0.88 MG/DL (ref 0.5–0.99)
EGFR: 84 ML/MIN/1.73M2
EOSINOPHILS: 3 %
GLOBULIN: 3.5 G/DL (CALC) (ref 1.9–3.7)
GLUCOSE: 86 MG/DL (ref 65–99)
HDL CHOLESTEROL: 40 MG/DL
HEMATOCRIT: 40.9 % (ref 35–45)
HEMOGLOBIN: 13.6 G/DL (ref 11.7–15.5)
LDL-CHOLESTEROL: 150 MG/DL (CALC)
LYMPHOCYTES: 27.2 %
MCH: 30.6 PG (ref 27–33)
MCHC: 33.3 G/DL (ref 32–36)
MCV: 92.1 FL (ref 80–100)
MONOCYTES: 5.9 %
MPV: 9.4 FL (ref 7.5–12.5)
NEUTROPHILS: 63.2 %
NON-HDL CHOLESTEROL: 181 MG/DL (CALC)
PLATELET COUNT: 357 THOUSAND/UL (ref 140–400)
POTASSIUM: 4.4 MMOL/L (ref 3.5–5.3)
PROTEIN, TOTAL: 7.5 G/DL (ref 6.1–8.1)
RDW: 11.8 % (ref 11–15)
RED BLOOD CELL COUNT: 4.44 MILLION/UL (ref 3.8–5.1)
SODIUM: 137 MMOL/L (ref 135–146)
TRIGLYCERIDES: 177 MG/DL
TSH: 3.8 MIU/L
VITAMIN B12: 321 PG/ML (ref 200–1100)
VITAMIN D, 25-OH, TOTAL: 32 NG/ML (ref 30–100)
WHITE BLOOD CELL COUNT: 7.1 THOUSAND/UL (ref 3.8–10.8)

## 2023-10-30 ENCOUNTER — HOSPITAL ENCOUNTER (OUTPATIENT)
Dept: MAMMOGRAPHY | Facility: HOSPITAL | Age: 43
Discharge: HOME | End: 2023-10-30
Attending: FAMILY MEDICINE
Payer: COMMERCIAL

## 2023-10-30 DIAGNOSIS — Z12.31 VISIT FOR SCREENING MAMMOGRAM: ICD-10-CM

## 2023-10-30 PROCEDURE — 77067 SCR MAMMO BI INCL CAD: CPT | Performed by: FAMILY MEDICINE

## 2023-10-30 PROCEDURE — 77063 BREAST TOMOSYNTHESIS BI: CPT | Performed by: FAMILY MEDICINE

## 2024-03-14 DIAGNOSIS — I10 ESSENTIAL HYPERTENSION, BENIGN: ICD-10-CM

## 2024-03-14 RX ORDER — AMLODIPINE BESYLATE 5 MG/1
5 TABLET ORAL DAILY
Qty: 90 TABLET | Refills: 0 | Status: SHIPPED | OUTPATIENT
Start: 2024-03-14

## 2024-03-14 NOTE — TELEPHONE ENCOUNTER
Hypertension Medications Protocol [id number]/14/2024 12:12 AM   Protocol Details CMP or BMP in past 12 months    Last BP reading less than 140/90    In person appointment or virtual visit in the past 12 mos or appointment in next 3 mos    EGFRCR or GFRNAA > 50        LOV  10/9/23     LAST LAB 10/28/23     LAST RX  8/14/23 90 with 1     Next OV No future appointments.      PROTOCOL pass

## 2024-03-19 ENCOUNTER — TELEPHONE (OUTPATIENT)
Dept: FAMILY MEDICINE CLINIC | Facility: CLINIC | Age: 44
End: 2024-03-19

## 2024-03-19 NOTE — TELEPHONE ENCOUNTER
Mark Galaviz,     Thank you for scheduling the appointment.  May I ask you to call the insurance company to see if they cover physicals any time during the year or every 365 days.  If every 365 days you are to early for one.  Your last physical was 10-9-23.    Thank you!    [employer]  [phone]    ===View-only below this line===      ----- Message -----       From:Anastacia Gannon       Sent:3/18/2024  8:52 PM CDT         To:[facility], [address] [place] [address]    Subject:Appointment scheduled from [facility]    Appointment For: Anastacia Gannon ([id number])  Visit Type: [facility] PHYSICAL ([id number])    7/1/2024     2:20 PM  40 mins.  Estrella House          [facility] [address] [place] [address]    Patient Comments:  Annual Health check

## 2024-06-15 DIAGNOSIS — I10 ESSENTIAL HYPERTENSION, BENIGN: ICD-10-CM

## 2024-06-18 RX ORDER — AMLODIPINE BESYLATE 5 MG/1
5 TABLET ORAL DAILY
Qty: 90 TABLET | Refills: 3 | Status: SHIPPED | OUTPATIENT
Start: 2024-06-18

## 2024-06-18 NOTE — TELEPHONE ENCOUNTER
Refill passed per [facility] protocols.    Requested Prescriptions   Pending Prescriptions Disp Refills    AMLODIPINE 5 MG Oral Tab [Pharmacy Med Name: AMLODIPINE BESYLATE 5 MG TAB] 90 tablet 3     Sig: TAKE 1 TABLET (5 MG TOTAL) BY MOUTH DAILY.       Hypertension Medications Protocol Passed - 6/15/2024  7:16 AM        Passed - CMP or BMP in past 12 months        Passed - Last BP reading less than 140/90     BP Readings from Last 1 Encounters:   10/09/23 110/78               Passed - In person appointment or virtual visit in the past 12 mos or appointment in next 3 mos     Recent Outpatient Visits              8 months ago Routine general medical examination at a health care facility    [facility], [address] [place] [address] Estrella Herrera MD    Office Visit    10 months ago Lateral epicondylitis of right elbow    [facility], [address] [place] [address] Estrella Herrera MD    Office Visit    1 year ago Routine general medical examination at a health care facility    [facility], [address] [place] [address] Estrella Herrera MD    Office Visit    2 years ago Seasonal allergies    [facility], [facility], [address] [place] [address] Olszewski, Kristen J, APRN    Office Visit    2 years ago Essential hypertension, benign    [address] Estrella House MD    Telemedicine          Future Appointments         Provider Department Appt Notes    In 1 week Estrella House MD [facility], [address] [place] [address] Annual Health check - phy last done 10-9-23 - message to Pt to early contact ins co                    Passed - EGFRCR or GFRNAA > 50     GFR Evaluation  EGFRCR: 84 , resulted on 10/27/2023

## 2024-07-01 ENCOUNTER — OFFICE VISIT (OUTPATIENT)
Dept: FAMILY MEDICINE CLINIC | Facility: CLINIC | Age: 44
End: 2024-07-01
Payer: COMMERCIAL

## 2024-07-01 VITALS
DIASTOLIC BLOOD PRESSURE: 76 MMHG | OXYGEN SATURATION: 98 % | HEIGHT: 62 IN | RESPIRATION RATE: 18 BRPM | SYSTOLIC BLOOD PRESSURE: 118 MMHG | WEIGHT: 145 LBS | BODY MASS INDEX: 26.68 KG/M2 | HEART RATE: 83 BPM | TEMPERATURE: 98 F

## 2024-07-01 DIAGNOSIS — Z00.00 ROUTINE GENERAL MEDICAL EXAMINATION AT A HEALTH CARE FACILITY: Primary | ICD-10-CM

## 2024-07-01 DIAGNOSIS — E55.9 VITAMIN D DEFICIENCY: ICD-10-CM

## 2024-07-01 DIAGNOSIS — Z78.9 VEGETARIAN DIET: ICD-10-CM

## 2024-07-01 DIAGNOSIS — I10 ESSENTIAL HYPERTENSION, BENIGN: ICD-10-CM

## 2024-07-01 DIAGNOSIS — Z12.31 ENCOUNTER FOR SCREENING MAMMOGRAM FOR MALIGNANT NEOPLASM OF BREAST: ICD-10-CM

## 2024-07-01 PROCEDURE — 99396 PREV VISIT EST AGE 40-64: CPT | Performed by: FAMILY MEDICINE

## 2024-07-01 RX ORDER — AMLODIPINE BESYLATE 5 MG/1
5 TABLET ORAL DAILY
Qty: 90 TABLET | Refills: 1 | Status: SHIPPED | OUTPATIENT
Start: 2024-07-01

## 2024-07-01 NOTE — PROGRESS NOTES
Anastacia Gannon is a 44 year old female.  Chief Complaint   Patient presents with    Physical     HPI:   Anastacia Gannon is a 44 year old female with history of hypertension seen for annual physical and medication refill.  Pap done in  was normal, menstrual cycle is regular and not heavy.  Does do breast self-exam, mammogram done in October last year was normal, no family history of breast cancer.    Healthy vegetarian diet, tries to walk for exercise.    Compliant with her blood pressure medication and low-salt diet, tolerating medication well without any side effects.  Has not been monitoring her blood pressure at home.    PAST MEDICAL HISTORY:     Past Medical History:    Essential hypertension    Hypertension complicating pregnancy, childbirth and puerperium with baby delivered and  complication (HCC)     PAST SURGICAL HISTORY:     Past Surgical History:   Procedure Laterality Date           ALLERGY:   No Known Allergies  MEDICATIONS:     Current Outpatient Medications   Medication Sig Dispense Refill    amLODIPine 5 MG Oral Tab Take 1 tablet (5 mg total) by mouth daily. 90 tablet 1     FAMILY HISTORY:     Family History   Problem Relation Age of Onset    Diabetes Father     High Blood Pressure Father     Hypertension Father     High Blood Pressure Mother     Diabetes Mother     Hypertension Mother      SOCIAL HISTORY:     Social History     Socioeconomic History    Marital status:      Spouse name: Socrates    Number of children: 1   Occupational History    Occupation: IT     Comment: [employer]   Tobacco Use    Smoking status: Never     Passive exposure: Never    Smokeless tobacco: Never   Vaping Use    Vaping status: Never Used   Substance and Sexual Activity    Alcohol use: No    Drug use: No    Sexual activity: Yes     Partners: Male   Other Topics Concern    Caffeine Concern No    Exercise Yes    Seat Belt Yes    Special Diet No    Stress Concern No    Weight Concern No   Social History  Narrative    Eats egg, mostly vegetarian     REVIEW OF SYSTEMS:   Review of Systems   Constitutional:  Negative for appetite change, fatigue, fever and unexpected weight change.   HENT:  Negative for congestion, ear pain, hearing loss and sore throat.    Eyes:  Negative for discharge, redness and visual disturbance.   Respiratory:  Negative for cough, chest tightness and shortness of breath.    Cardiovascular:  Negative for chest pain and palpitations.   Gastrointestinal:  Negative for abdominal pain, blood in stool, constipation and nausea.   Endocrine: Negative for cold intolerance, heat intolerance and polyuria.   Genitourinary:  Negative for difficulty urinating, dysuria, frequency and urgency.   Musculoskeletal:  Negative for arthralgias, gait problem, joint swelling and myalgias.   Skin:  Negative for rash.   Allergic/Immunologic: Negative for food allergies.   Neurological:  Negative for dizziness, weakness, numbness and headaches.   Hematological:  Negative for adenopathy. Does not bruise/bleed easily.   Psychiatric/Behavioral:  Negative for dysphoric mood and sleep disturbance. The patient is not nervous/anxious.         PHYSICAL EXAM:   /76   Pulse 83   Temp 97.9 °F (36.6 °C) (Temporal)   Resp 18   Ht 5' 2\" (1.575 m)   Wt 145 lb (65.8 kg)   LMP 06/13/2024   SpO2 98%   BMI 26.52 kg/m²     Physical Exam  Constitutional:       General: She is not in acute distress.     Appearance: Normal appearance. She is well-developed and normal weight.   HENT:      Head: Normocephalic and atraumatic.      Right Ear: Tympanic membrane, ear canal and external ear normal.      Left Ear: Tympanic membrane, ear canal and external ear normal.      Nose: Nose normal.      Mouth/Throat:      Mouth: Mucous membranes are moist.      Pharynx: Oropharynx is clear.   Eyes:      Extraocular Movements: Extraocular movements intact.      Conjunctiva/sclera: Conjunctivae normal.      Pupils: Pupils are equal, round, and  reactive to light.   Neck:      Thyroid: No thyromegaly.   Cardiovascular:      Rate and Rhythm: Normal rate and regular rhythm.      Pulses: Normal pulses.      Heart sounds: Normal heart sounds. No murmur heard.  Pulmonary:      Effort: Pulmonary effort is normal. No respiratory distress.      Breath sounds: Normal breath sounds.   Chest:      Chest wall: No tenderness.   Abdominal:      General: Abdomen is flat. Bowel sounds are normal. There is no distension.      Palpations: Abdomen is soft. There is no hepatomegaly, splenomegaly or mass.      Tenderness: There is no abdominal tenderness.      Hernia: No hernia is present.   Musculoskeletal:         General: Normal range of motion.      Cervical back: Normal range of motion and neck supple.      Right lower leg: No edema.      Left lower leg: No edema.   Lymphadenopathy:      Cervical: No cervical adenopathy.   Skin:     General: Skin is warm.      Findings: No rash.   Neurological:      General: No focal deficit present.      Mental Status: She is alert and oriented to person, place, and time.      Cranial Nerves: No cranial nerve deficit.      Sensory: No sensory deficit.      Motor: No weakness.      Coordination: Coordination normal.      Gait: Gait normal.      Deep Tendon Reflexes: Reflexes are normal and symmetric. Reflexes normal.   Psychiatric:         Mood and Affect: Mood normal.         Behavior: Behavior normal.         Thought Content: Thought content normal.         Judgment: Judgment normal.          ASSESSMENT AND PLAN:   Anastacia was seen today for physical.    Diagnoses and all orders for this visit:    Routine general medical examination at a health care facility  -     Assay, Thyroid Stim Hormone  -     Lipid Panel  -     Comp Metabolic Panel (14)  -     CBC With Differential With Platelet    Essential hypertension, benign well controlled  -     amLODIPine 5 MG Oral Tab; Take 1 tablet (5 mg total) by mouth daily.  -     continue the same dose  of medication  -     limit salt to less than 1000 mg a day  -     goal BP is less than 130/80    Vitamin D deficiency  -     Vitamin D, 25-Hydroxy    Vegetarian diet  -     Vitamin B12    Encounter for screening mammogram for malignant neoplasm of breast  -     [facility] ROSMERY 2D+3D SCREENING BILAT (CPT=77067/43778); Future    Age appropriate anticipatory guidance reviewed  Health Maintenance   Topic Date Due    COVID-19 Vaccine (5 - 2023-24 season) 09/01/2023    Influenza Vaccine (1) 10/01/2024    Annual Physical  Done today    Mammogram  10/30/2024    Pap Smear  09/08/2025    DTaP,Tdap,and Td Vaccines (2 - Td or Tdap) 09/02/2026    Annual Depression Screening  Completed    Pneumococcal Vaccine: Birth to 64yrs  Aged Out        The 21st Century Cures Act makes medical notes like these available to patients in the interest of transparency. Please be advised this is a medical document. Medical documents are intended to carry relevant information, facts as evident, and the clinical opinion of the practitioner. The medical note is intended as peer to peer communication and may appear blunt or direct. It is written in medical language and may contain abbreviations or verbiage that are unfamiliar.

## 2024-08-03 LAB
ABSOLUTE BASOPHILS: 38 CELLS/UL (ref 0–200)
ABSOLUTE EOSINOPHILS: 211 CELLS/UL (ref 15–500)
ABSOLUTE LYMPHOCYTES: 2291 CELLS/UL (ref 850–3900)
ABSOLUTE MONOCYTES: 480 CELLS/UL (ref 200–950)
ABSOLUTE NEUTROPHILS: 3379 CELLS/UL (ref 1500–7800)
ALBUMIN/GLOBULIN RATIO: 1.1 (CALC) (ref 1–2.5)
ALBUMIN: 3.9 G/DL (ref 3.6–5.1)
ALKALINE PHOSPHATASE: 78 U/L (ref 31–125)
ALT: 11 U/L (ref 6–29)
AST: 14 U/L (ref 10–30)
BASOPHILS: 0.6 %
BILIRUBIN, TOTAL: 0.5 MG/DL (ref 0.2–1.2)
BUN: 7 MG/DL (ref 7–25)
CALCIUM: 9.5 MG/DL (ref 8.6–10.2)
CARBON DIOXIDE: 24 MMOL/L (ref 20–32)
CHLORIDE: 105 MMOL/L (ref 98–110)
CHOL/HDLC RATIO: 6.2 (CALC)
CHOLESTEROL, TOTAL: 223 MG/DL
CREATININE: 0.83 MG/DL (ref 0.5–0.99)
EGFR: 90 ML/MIN/1.73M2
EOSINOPHILS: 3.3 %
GLOBULIN: 3.5 G/DL (CALC) (ref 1.9–3.7)
GLUCOSE: 92 MG/DL (ref 65–99)
HDL CHOLESTEROL: 36 MG/DL
HEMATOCRIT: 40.9 % (ref 35–45)
HEMOGLOBIN: 13.5 G/DL (ref 11.7–15.5)
LDL-CHOLESTEROL: 151 MG/DL (CALC)
LYMPHOCYTES: 35.8 %
MCH: 31.3 PG (ref 27–33)
MCHC: 33 G/DL (ref 32–36)
MCV: 94.9 FL (ref 80–100)
MONOCYTES: 7.5 %
MPV: 8.9 FL (ref 7.5–12.5)
NEUTROPHILS: 52.8 %
NON-HDL CHOLESTEROL: 187 MG/DL (CALC)
PLATELET COUNT: 332 THOUSAND/UL (ref 140–400)
POTASSIUM: 4.5 MMOL/L (ref 3.5–5.3)
PROTEIN, TOTAL: 7.4 G/DL (ref 6.1–8.1)
RDW: 11.9 % (ref 11–15)
RED BLOOD CELL COUNT: 4.31 MILLION/UL (ref 3.8–5.1)
SODIUM: 139 MMOL/L (ref 135–146)
TRIGLYCERIDES: 216 MG/DL
TSH: 3.88 MIU/L
VITAMIN B12: 778 PG/ML (ref 200–1100)
VITAMIN D, 25-OH, TOTAL: 31 NG/ML (ref 30–100)
WHITE BLOOD CELL COUNT: 6.4 THOUSAND/UL (ref 3.8–10.8)

## 2024-08-21 ENCOUNTER — OFFICE VISIT (OUTPATIENT)
Dept: FAMILY MEDICINE CLINIC | Facility: CLINIC | Age: 44
End: 2024-08-21
Payer: COMMERCIAL

## 2024-08-21 VITALS
HEIGHT: 62 IN | WEIGHT: 140 LBS | DIASTOLIC BLOOD PRESSURE: 72 MMHG | BODY MASS INDEX: 25.76 KG/M2 | SYSTOLIC BLOOD PRESSURE: 105 MMHG | OXYGEN SATURATION: 99 % | TEMPERATURE: 98 F | RESPIRATION RATE: 16 BRPM | HEART RATE: 83 BPM

## 2024-08-21 DIAGNOSIS — J02.9 SORE THROAT: ICD-10-CM

## 2024-08-21 DIAGNOSIS — J06.9 VIRAL URI: Primary | ICD-10-CM

## 2024-08-21 LAB
CONTROL LINE PRESENT WITH A CLEAR BACKGROUND (YES/NO): YES YES/NO
KIT LOT #: NORMAL NUMERIC

## 2024-08-21 PROCEDURE — 87635 SARS-COV-2 COVID-19 AMP PRB: CPT | Performed by: NURSE PRACTITIONER

## 2024-08-21 PROCEDURE — 99213 OFFICE O/P EST LOW 20 MIN: CPT | Performed by: NURSE PRACTITIONER

## 2024-08-21 PROCEDURE — 87880 STREP A ASSAY W/OPTIC: CPT | Performed by: NURSE PRACTITIONER

## 2024-08-21 NOTE — PROGRESS NOTES
CHIEF COMPLAINT:     Chief Complaint   Patient presents with    Cough     X 2 days, runny nose, throat pain, feverish, OTC advil        HPI:   Anastacia Gannon is a 44 year old female who presents for sinus congestion, rhinitis, sore throat, occasional cough, feeling feverish. Symptoms present x 2 days. .  Treating symptoms with ibuprofen and zarbees.  Patient has not tested for COVID since symptom onset.    Current Outpatient Medications   Medication Sig Dispense Refill    amLODIPine 5 MG Oral Tab Take 1 tablet (5 mg total) by mouth daily. 90 tablet 1      Past Medical History:    Essential hypertension    Hypertension complicating pregnancy, childbirth and puerperium with baby delivered and  complication (HCC)      Past Surgical History:   Procedure Laterality Date               Social History     Socioeconomic History    Marital status:      Spouse name: Socrates    Number of children: 1   Occupational History    Occupation: IT     Comment: [employer]   Tobacco Use    Smoking status: Never     Passive exposure: Never    Smokeless tobacco: Never   Vaping Use    Vaping status: Never Used   Substance and Sexual Activity    Alcohol use: No    Drug use: No    Sexual activity: Yes     Partners: Male   Other Topics Concern    Caffeine Concern No    Exercise Yes    Seat Belt Yes    Special Diet No    Stress Concern No    Weight Concern No   Social History Narrative    Eats egg, mostly vegetarian         REVIEW OF SYSTEMS:   GENERAL: decreased appetite  SKIN: no rashes or abnormal skin lesions  HEENT: See HPI  LUNGS: denies shortness of breath or wheezing, See HPI  CARDIOVASCULAR: denies chest pain or palpitations   GI: denies N/V/C or abdominal pain  NEURO: Denies dizziness or weakness    EXAM:   /72   Pulse 83   Temp 98.3 °F (36.8 °C)   Resp 16   Ht 5' 2\" (1.575 m)   Wt 140 lb (63.5 kg)   LMP 2024 (Approximate)   SpO2 99%   BMI 25.61 kg/m²   GENERAL: well developed, well nourished,in  no apparent distress  SKIN: no rashes,no suspicious lesions  HEAD: atraumatic, normocephalic.  no tenderness on palpation of  sinuses  EYES: conjunctiva clear, EOM intact  EARS: TM's gray, no bulging, no retraction,no fluid, bony landmarks visible  NOSE: Nostrils patent, clear nasal discharge, nasal mucosa erythematous   THROAT: Oral mucosa pink, moist. Posterior pharynx is  erythematous. no exudates. Tonsils 1/4.    NECK: Supple, non-tender  LUNGS: clear to auscultation bilaterally, no wheezes or rhonchi. Breathing is non labored.  CARDIO: RRR without murmur  EXTREMITIES: no cyanosis, clubbing or edema  LYMPH:  + anterior cervical lymphadenopathy.        ASSESSMENT AND PLAN:   Anastacia Gannon is a 44 year old female who presents with upper respiratory symptoms that are consistent with    ASSESSMENT:   Encounter Diagnoses   Name Primary?    Sore throat     Viral URI Yes     Rapid strep neg  COVID pcr ordered    PLAN:     Comfort care per pt instructions.   To follow up for any new or worsening symptoms or if not improving the next few days.   To go to the ER for any severe symptoms such as difficulty breathing or chest pain.     Meds & Refills for this Visit:  Requested Prescriptions      No prescriptions requested or ordered in this encounter       Risks, benefits, and side effects of medication explained and discussed.    Patient Instructions   Push fluids and rest  You can take an over the counter cold/ flu medication if needed  Follow up for any new or worsening symptoms or if symptoms are not improving over the next few days.      The patient indicates understanding of these issues and agrees to the plan.  The patient is asked to return if sx's persist or worsen.

## 2024-08-21 NOTE — PATIENT INSTRUCTIONS
Push fluids and rest  You can take an over the counter cold/ flu medication if needed  Follow up for any new or worsening symptoms or if symptoms are not improving over the next few days.

## 2024-08-22 LAB — SARS-COV-2 RNA RESP QL NAA+PROBE: DETECTED

## 2024-09-12 ENCOUNTER — PATIENT MESSAGE (OUTPATIENT)
Dept: FAMILY MEDICINE CLINIC | Facility: CLINIC | Age: 44
End: 2024-09-12

## 2024-09-12 DIAGNOSIS — E78.00 PURE HYPERCHOLESTEROLEMIA: Primary | ICD-10-CM

## 2024-09-13 RX ORDER — ROSUVASTATIN CALCIUM 5 MG/1
5 TABLET, COATED ORAL NIGHTLY
Qty: 30 TABLET | Refills: 2 | Status: SHIPPED | OUTPATIENT
Start: 2024-09-13

## 2024-09-13 NOTE — TELEPHONE ENCOUNTER
From: Anastacia Gannon  To: Estrella House  Sent: 9/12/2024 10:40 PM CDT  Subject: Lipid profile    Hello Doctor,    As per you comment do i need to start on statin low dose? If so then can you start the same and then we can figure it out from there

## 2024-12-14 DIAGNOSIS — E78.00 PURE HYPERCHOLESTEROLEMIA: ICD-10-CM

## 2024-12-18 RX ORDER — ROSUVASTATIN CALCIUM 5 MG/1
5 TABLET, COATED ORAL NIGHTLY
Qty: 90 TABLET | Refills: 1 | Status: SHIPPED | OUTPATIENT
Start: 2024-12-18

## 2024-12-18 NOTE — TELEPHONE ENCOUNTER
Please review. Protocol Failed; No Protocol    Requested Prescriptions   Pending Prescriptions Disp Refills    ROSUVASTATIN 5 MG Oral Tab [Pharmacy Med Name: ROSUVASTATIN CALCIUM 5 MG TAB] 90 tablet 0     Sig: TAKE 1 TABLET BY MOUTH EVERY DAY AT NIGHT       Cholesterol Medication Protocol Passed - 12/18/2024  4:06 PM        Passed - ALT < 80     Lab Results   Component Value Date    ALT 11 08/02/2024             Passed - ALT resulted within past year        Passed - Lipid panel within past 12 months     Lab Results   Component Value Date    CHOLEST 223 (H) 08/02/2024    TRIG 216 (H) 08/02/2024    HDL 36 (L) 08/02/2024     (H) 08/02/2024    VLDL 34 (H) 09/25/2018    TCHDLRATIO 6.2 (H) 08/02/2024    NONHDLC 187 (H) 08/02/2024             Passed - In person appointment or virtual visit in the past 12 mos or appointment in next 3 mos     Recent Outpatient Visits              3 months ago Viral URI    [facility], [address] [place] [address] Julianne Gonzalez APRN    Office Visit    5 months ago Routine general medical examination at a health care facility    [address]    Office Visit    1 year ago Routine general medical examination at a health care facility    [address] Estrella Herrera MD    Office Visit    1 year ago Lateral epicondylitis of right elbow    [address] Estrella House MD    Office Visit    2 years ago Routine general medical examination at a health care facility    [address] Estrella Herrera MD    Office Visit          Future Appointments         Provider Department Appt Notes    In 1 month Estrella House MD [address] 6 month follow up                           Future Appointments         Provider Department Appt  Notes    In 1 month Estrella House MD [facility], [address] [place] [address] 6 month follow up          Recent Outpatient Visits              3 months ago Viral URI    [facility], [facility], [address] [place] [address] Julianne Gonzalez APRN    Office Visit    5 months ago Routine general medical examination at a health care facility    [facility], [address] [place], [place] [address]erville Estrella House MD    Office Visit    1 year ago Routine general medical examination at a health care facility    [facility], [address] [place] [address], Estrella Herrera MD    Office Visit    1 year ago Lateral epicondylitis of right elbow    [facility], [address] [place] [address], Estrella Herrera MD    Office Visit    2 years ago Routine general medical examination at a health care facility    [facility], [address] [place] [address], Estrella Herrera MD    Office Visit

## 2025-02-03 LAB
AMB EXT BILIRUBIN, TOTAL: 0.3 MG/DL
AMB EXT CHOLESTEROL, TOTAL: 130 MG/DL
AMB EXT HDL CHOLESTEROL: 33 MG/DL
AMB EXT LDL CHOLESTEROL, DIRECT: 130 MG/DL
AMB EXT TOTAL PROTEIN: 7.5
AMB EXT TRIGLYCERIDES: 142 MG/DL
AMB EXT VITAMIN D, 25-HYDROXY: 22.43
AMB EXT VLDL: 12 MG/DL

## 2025-02-07 ENCOUNTER — OFFICE VISIT (OUTPATIENT)
Dept: FAMILY MEDICINE CLINIC | Facility: CLINIC | Age: 45
End: 2025-02-07
Payer: COMMERCIAL

## 2025-02-07 VITALS
TEMPERATURE: 98 F | DIASTOLIC BLOOD PRESSURE: 80 MMHG | HEART RATE: 91 BPM | RESPIRATION RATE: 18 BRPM | SYSTOLIC BLOOD PRESSURE: 110 MMHG | BODY MASS INDEX: 26.68 KG/M2 | HEIGHT: 62 IN | OXYGEN SATURATION: 97 % | WEIGHT: 145 LBS

## 2025-02-07 DIAGNOSIS — E55.9 VITAMIN D DEFICIENCY: ICD-10-CM

## 2025-02-07 DIAGNOSIS — I10 ESSENTIAL HYPERTENSION, BENIGN: Primary | ICD-10-CM

## 2025-02-07 DIAGNOSIS — E78.00 PURE HYPERCHOLESTEROLEMIA: ICD-10-CM

## 2025-02-07 PROCEDURE — 99214 OFFICE O/P EST MOD 30 MIN: CPT | Performed by: FAMILY MEDICINE

## 2025-02-07 RX ORDER — AMLODIPINE BESYLATE 5 MG/1
5 TABLET ORAL DAILY
Qty: 90 TABLET | Refills: 1 | Status: SHIPPED | OUTPATIENT
Start: 2025-02-07

## 2025-02-07 RX ORDER — ERGOCALCIFEROL 1.25 MG/1
50000 CAPSULE, LIQUID FILLED ORAL WEEKLY
Qty: 12 CAPSULE | Refills: 0 | Status: SHIPPED | OUTPATIENT
Start: 2025-02-07

## 2025-02-07 RX ORDER — ROSUVASTATIN CALCIUM 5 MG/1
5 TABLET, COATED ORAL NIGHTLY
Qty: 90 TABLET | Refills: 1 | Status: SHIPPED | OUTPATIENT
Start: 2025-02-07

## 2025-02-07 NOTE — PROGRESS NOTES
Family Medicine Progress Note  ASSESSMENT AND PLAN:  Anastacia Gannon is a 44 year old female who is here for:     Anastacia was seen today for medication follow-up.    Diagnoses and all orders for this visit:    Essential hypertension, benign controlled  -     amLODIPine 5 MG Oral Tab; Take 1 tablet (5 mg total) by mouth daily.  -  continue the same dose of medication  -  DASH diet, limit salt to less than 2000 mg  -  Goal BP less than 130/80      Pure hypercholesterolemia controlled  -     rosuvastatin 5 MG Oral Tab; Take 1 tablet (5 mg total) by mouth nightly.  -     continue the same dose of medication.    Vitamin D deficiency  -     ergocalciferol 1.25 MG (46784 UT) Oral Cap; Take 1 capsule (50,000 Units total) by mouth once a week.        The patient indicates understanding of these issues and agrees to the plan.  Follow-Up: The patient is asked to return in Return in about 6 months (around 8/7/2025) for annual, HTN f/u, hyperlipidemia.  .     Estrella House MD   02/07/25      CC: Medication Follow-Up    HPI:   Anastacia Gannon is a 44 year old female who presents for Medication Follow-Up     Patient  has been compliant with the blood pressure medication and low-salt diet, tolerating medication well without any side effects.  Denies headache, dizziness, vision changes, chest pain, palpitations, ESCAMILLA or swelling in her legs.     has been compliant with her cholesterol medication and wants to discuss if she needs to continue to take it.    [facility] had labs done in [place] and would like to go over the results.    ALLERGY:     Allergies as of 02/07/2025    (No Known Allergies)     MEDICATIONS:     Current Outpatient Medications   Medication Sig Dispense Refill    rosuvastatin 5 MG Oral Tab Take 1 tablet (5 mg total) by mouth nightly. 90 tablet 1    amLODIPine 5 MG Oral Tab Take 1 tablet (5 mg total) by mouth daily. 90 tablet 1      Past Medical History:    Essential hypertension    Hypertension complicating  pregnancy, childbirth and puerperium with baby delivered and  complication (HCC)      Social History:  Social History     Socioeconomic History    Marital status:      Spouse name: Socrates    Number of children: 1   Occupational History    Occupation: IT     Comment: [employer]   Tobacco Use    Smoking status: Never     Passive exposure: Never    Smokeless tobacco: Never   Vaping Use    Vaping status: Never Used   Substance and Sexual Activity    Alcohol use: No    Drug use: No    Sexual activity: Yes     Partners: Male   Other Topics Concern    Caffeine Concern No    Exercise Yes    Seat Belt Yes    Special Diet No    Stress Concern No    Weight Concern No   Social History Narrative    Eats egg, mostly vegetarian        REVIEW OF SYSTEMS:   A comprehensive 10 point review of systems was completed.  Pertinent positives and negatives noted in the the HPI.      EXAM:   /80   Pulse 91   Temp 98.1 °F (36.7 °C) (Temporal)   Resp 18   Ht 5' 2\" (1.575 m)   Wt 145 lb (65.8 kg)   LMP 2025 (Approximate)   SpO2 97%   BMI 26.52 kg/m²   GENERAL: well developed, well nourished,in no apparent distress  NECK: supple,no adenopathy,no bruits  LUNGS: clear to auscultation  CARDIO: RRR without murmur  GI: good BS's,no masses, HSM or tenderness  EXTREMITIES: no cyanosis, clubbing or edema    Component      Latest Ref Rng 2/3/2025   Cholesterol, Total      mg/dL 130 (E)   HDL Cholesterol      mg/dL 33 (E)   DIRECT LDL      mg/dL 130 (E)   Triglycerides      mg/dL 142 (E)   VLDL      mg/dL 12 (E)   Total Bilirubin      mg/dL 0.3 (E)   PROTEIN, TOTAL 7.5 (E)   VIT D, 25-HYDROXY 22.43 (E)      Legend:  (E) External lab result    NOTE TO PATIENT: The  Century Cures Act makes clinical notes like these available to patients in the interest of transparency. Clinical notes are medical documents used by physicians and care providers to communicate with each other. These documents include medical language and  terminology, abbreviations, and treatment information that may sound technical and at times possibly unfamiliar. In addition, at times, the verbiage may appear blunt or direct. These documents are one tool providers use to communicate relevant information and clinical opinions of the care providers in a way that allows common understanding of the clinical context.      Estrella House MD

## 2025-02-08 ENCOUNTER — MED REC SCAN ONLY (OUTPATIENT)
Dept: FAMILY MEDICINE CLINIC | Facility: CLINIC | Age: 45
End: 2025-02-08

## 2025-02-16 ENCOUNTER — APPOINTMENT (OUTPATIENT)
Dept: GENERAL RADIOLOGY | Facility: HOSPITAL | Age: 45
End: 2025-02-16
Payer: COMMERCIAL

## 2025-02-16 ENCOUNTER — HOSPITAL ENCOUNTER (EMERGENCY)
Facility: HOSPITAL | Age: 45
Discharge: HOME | End: 2025-02-16
Attending: EMERGENCY MEDICINE
Payer: COMMERCIAL

## 2025-02-16 VITALS
WEIGHT: 141.13 LBS | RESPIRATION RATE: 18 BRPM | SYSTOLIC BLOOD PRESSURE: 150 MMHG | TEMPERATURE: 98 F | DIASTOLIC BLOOD PRESSURE: 93 MMHG | BODY MASS INDEX: 25.97 KG/M2 | HEIGHT: 62 IN | HEART RATE: 68 BPM | OXYGEN SATURATION: 100 %

## 2025-02-16 DIAGNOSIS — M79.645 PAIN OF LEFT THUMB: Primary | ICD-10-CM

## 2025-02-16 PROCEDURE — 99283 EMERGENCY DEPT VISIT LOW MDM: CPT

## 2025-02-16 PROCEDURE — 73140 X-RAY EXAM OF FINGER(S): CPT

## 2025-02-17 NOTE — ED INITIAL ASSESSMENT (HPI)
Pt c/o L thumb pain, states she accidentally slammed finger to the car door since it was windy. No open wound noted, seen with slight swelling

## 2025-02-17 NOTE — DISCHARGE INSTRUCTIONS
You were seen in the emergency department.  Your x-ray did not show any fractures.  You can take Tylenol or ibuprofen as needed for pain.  Return to the ER if you develop severe pain, numbness or tingling, or new concerns or worsening symptoms.  Please follow up closely with your primary care physician.

## 2025-02-17 NOTE — ED PROVIDER NOTES
Patient Seen in: [facility] Emergency Department      History     Chief Complaint   Patient presents with    Finger Injury     Stated Complaint: Slammed left thumb in door    Subjective:   HPI  Patient is a 45 yo F with a history of HTN who presents to ED for evaluation of L thumb pain after slamming finger in car door just PTA. Patient states it was windy out so the door accidentally shut on her finger. Notes associated swelling and pain to distal L thumb. No numbness, tingling, weakness. Pt is right handed. No other injuries.     Objective:     No pertinent past medical history.            No pertinent past surgical history.              No pertinent social history.                Physical Exam     ED Triage Vitals [02/16/25 1924]   BP (!) 150/96   Pulse 75   Resp 20   Temp 97.8 °F (36.6 °C)   Temp src Temporal   SpO2 100 %   O2 Device None (Room air)       Current Vitals:   Vital Signs  BP: (!) 150/93  Pulse: 68  Resp: 18  Temp: 97.8 °F (36.6 °C)  Temp src: Temporal  MAP (mmHg): (!) 111    Oxygen Therapy  SpO2: 100 %  O2 Device: None (Room air)        Physical Exam  Vitals and nursing note reviewed.   Constitutional:       General: She is not in acute distress.     Appearance: She is not ill-appearing.   HENT:      Head: Normocephalic and atraumatic.   Cardiovascular:      Pulses: Normal pulses.   Pulmonary:      Effort: Pulmonary effort is normal.   Musculoskeletal:        Hands:       Comments: Pain with flexion of DIP due to swelling  Otherwise normal extension/abduction/opposition/adduction  Normal sensation  2+ distal pulse  No scaphoid TTP or other TTP of hand   Neurological:      Mental Status: She is alert.             ED Course   Labs Reviewed - No data to display              MDM      Patient is a 45 yo F with a history of HTN who presents to ED for evaluation of L thumb pain after slamming finger in car door just PTA.  Vital signs are stable.  Patient has tenderness palpation, ecchymosis and  swelling to distal left thumb.  She has pain with flexion of left thumb due to swelling.  Normal sensation, good cap refill.  2+ radial pulse.    XR independently reviewed which shows no fracture.  Recommended supportive care with rest, ice, NSAIDs and Tylenol.  Discussed return precautions.  Patient stable for discharge home with PCP follow-up.  She verbalized understanding and agreement of plan.        Medical Decision Making  Amount and/or Complexity of Data Reviewed  Radiology: ordered and independent interpretation performed. Decision-making details documented in ED Course.        Disposition and Plan     Clinical Impression:  1. Pain of left thumb         Disposition:  Discharge  2/16/2025  9:02 pm    Follow-up:  Estrella House MD  [address] [place] [id number]  [phone]    Schedule an appointment as soon as possible for a visit in 1 day(s)            Medications Prescribed:  Discharge Medication List as of 2/16/2025  9:05 PM              Supplementary Documentation:

## 2025-06-13 ENCOUNTER — OFFICE VISIT (OUTPATIENT)
Dept: FAMILY MEDICINE CLINIC | Facility: CLINIC | Age: 45
End: 2025-06-13
Payer: COMMERCIAL

## 2025-06-13 ENCOUNTER — HOSPITAL ENCOUNTER (OUTPATIENT)
Dept: GENERAL RADIOLOGY | Age: 45
Discharge: HOME | End: 2025-06-13
Attending: FAMILY MEDICINE
Payer: COMMERCIAL

## 2025-06-13 VITALS
TEMPERATURE: 98 F | HEART RATE: 82 BPM | HEIGHT: 62 IN | WEIGHT: 142 LBS | BODY MASS INDEX: 26.13 KG/M2 | RESPIRATION RATE: 18 BRPM | SYSTOLIC BLOOD PRESSURE: 124 MMHG | DIASTOLIC BLOOD PRESSURE: 82 MMHG | OXYGEN SATURATION: 98 %

## 2025-06-13 DIAGNOSIS — M79.672 PAIN OF LEFT HEEL: ICD-10-CM

## 2025-06-13 DIAGNOSIS — Z00.00 LABORATORY EXAM ORDERED AS PART OF ROUTINE GENERAL MEDICAL EXAMINATION: ICD-10-CM

## 2025-06-13 DIAGNOSIS — M79.672 PAIN OF LEFT HEEL: Primary | ICD-10-CM

## 2025-06-13 DIAGNOSIS — M72.2 PLANTAR FASCIITIS OF LEFT FOOT: ICD-10-CM

## 2025-06-13 DIAGNOSIS — M25.9 KNEE JOINT DISORDER: ICD-10-CM

## 2025-06-13 DIAGNOSIS — N92.6 IRREGULAR MENSES: ICD-10-CM

## 2025-06-13 DIAGNOSIS — Z78.9 VEGETARIAN DIET: ICD-10-CM

## 2025-06-13 DIAGNOSIS — E55.9 VITAMIN D DEFICIENCY: ICD-10-CM

## 2025-06-13 PROCEDURE — 99213 OFFICE O/P EST LOW 20 MIN: CPT | Performed by: FAMILY MEDICINE

## 2025-06-13 PROCEDURE — 73650 X-RAY EXAM OF HEEL: CPT | Performed by: FAMILY MEDICINE

## 2025-06-13 NOTE — PATIENT INSTRUCTIONS
VISIT SUMMARY:    Today, you were seen for left foot pain that has been bothering you for the past couple of weeks, especially in the morning. We also discussed some discomfort in your left knee and menstrual irregularities.    YOUR PLAN:    -PLANTAR FASCIITIS: Plantar fasciitis is a condition that causes pain in the heel and bottom of the foot due to inflammation of the tissue. You should continue with your current regimen of stretching and icing three times daily. We will order an x-ray of your left heel to check for a heel spur, which could be irritating the plantar fascia. If the pain persists, we may refer you to a podiatrist for a possible steroid injection. Additionally, using a night foot splint and taking NSAIDs like Advil for severe pain are recommended.    -KNEE DISCOMFORT: Your left knee discomfort is likely due to changes in your walking pattern from the plantar fasciitis and possible muscle weakness. It is important to resume strength training exercises for your quadriceps and hamstrings. Please monitor for any changes in pain or swelling.    -PERIMENOPAUSAL SYMPTOMS: The irregularity in your menstrual cycle may be a sign of perimenopause, which is the transition period before menopause. It would be helpful to discuss your family's menopause patterns with your mother and keep track of any further irregularities in your menstrual cycle.    -GENERAL HEALTH MAINTENANCE: You are due for a physical examination. Since you plan to travel to [facility], we will schedule this examination for September after your return. We will also order lab tests to be done before your physical examination.    INSTRUCTIONS:    Please follow up with the x-ray of your left heel as ordered. Continue your current regimen for your foot pain and start strength training exercises for your knee. Monitor your menstrual cycle and discuss familial menopause patterns with your mother. Schedule your physical examination for September after  your trip to [place] and complete the lab tests before the examination.

## 2025-06-13 NOTE — PROGRESS NOTES
Family Medicine Progress Note  ASSESSMENT AND PLAN:  Anastacia Gannon is a 44 year old female who is here for:     Anastacia was seen today for other and ankle pain.    Diagnoses and all orders for this visit:    Pain of left heel  -     XR HEEL (CALCANEUS) (MIN 2 VIEWS), LEFT (CPT=73650); Future    Plantar fasciitis of left foot  Chronic left foot pain consistent with plantar fasciitis. Managed with stretching, icing, and castor oil with some relief. Potential heel spur may irritate plantar fascia. Consider podiatrist referral for steroid injection if symptoms persist.  - Order x-ray of left heel for heel spur.  - Continue stretching and icing three times daily.  - Consider podiatrist referral for steroid injection if pain persists.  - Recommend night foot splint.  - Advise NSAIDs like Advil for severe pain.    Knee joint disorder  Left knee discomfort likely due to altered gait from plantar fasciitis.   - Encourage strength training for quadriceps and hamstrings.  - Monitor for pain or swelling changes.    Vegetarian diet  -     Vitamin B12; Future    Vitamin D deficiency  -     Vitamin D, 25-Hydroxy; Future    Laboratory exam ordered as part of routine general medical examination  -     Assay, Thyroid Stim Hormone; Future  -     Lipid Panel; Future  -     Comp Metabolic Panel (14); Future  -     CBC With Differential With Platelet; Future    Irregular menses  Menstrual irregularity suggests possible perimenopausal changes. Familial menopause patterns may provide insight.  - Discuss familial menopause patterns with mother.  - Monitor menstrual cycle for irregularities.       The patient indicates understanding of these issues and agrees to the plan.  Follow-Up: The patient is asked to return in Return if symptoms worsen or fail to improve.  .     Estrella House MD        CC:  Ankle Pain (Left ankle pain for last couple of weeks)      The following individual(s) verbally consented to be recorded using ambient AI  listening technology and understand that they can each withdraw their consent to this listening technology at any point by asking the clinician to turn off or pause the recording:    Patient name: Anastacia Gannon    HPI:     History of Present Illness  Anastacia Gannon is a 44 year old female who presents with left foot pain.    She has been experiencing significant pain in her left foot upon waking up for the past couple of weeks, which improves as the day progresses. She has been trying various home remedies, including stretching exercises, icing, using a tennis ball and applying castor oil before bed, which she feels may have provided some relief. She wears shoes and socks at home and does not walk barefoot.    The pain is most severe in the morning when she first stands up, describing it as 'not able to just stand up for a few seconds.' The right foot is unaffected.    Additionally, she notes a creaking sensation and perceived weakness in her left knee. No knee pain or swelling. She has been inconsistent with strength training exercises, which she used to do more regularly.    Her menstrual periods were delayed by two weeks this time, although the flow was normal.     ALLERGY:     Allergies as of 06/13/2025    (No Known Allergies)       MEDICATIONS:     Current Medications[1]   Past Medical History[2]   Social History:  Short Social Hx on File[3]     REVIEW OF SYSTEMS:   ROS as documented in HPI    EXAM:   /82   Pulse 82   Temp 98.2 °F (36.8 °C) (Temporal)   Resp 18   Ht 5' 2\" (1.575 m)   Wt 142 lb (64.4 kg)   LMP 06/13/2025 (Approximate)   SpO2 98%   BMI 25.97 kg/m²   GENERAL: well developed, well nourished,in no apparent distress  LUNGS: clear to auscultation  CARDIO: RRR without murmur  KNEE: left, no swelling, no tenderness to palpation along the joint line, no crepitus, normal ROM  FOOT: left, tenderness to palpation over the heel.    NOTE TO PATIENT: The 21st Century Cures Act makes clinical notes  like these available to patients in the interest of transparency. Clinical notes are medical documents used by physicians and care providers to communicate with each other. These documents include medical language and terminology, abbreviations, and treatment information that may sound technical and at times possibly unfamiliar. In addition, at times, the verbiage may appear blunt or direct. These documents are one tool providers use to communicate relevant information and clinical opinions of the care providers in a way that allows common understanding of the clinical context.      Estrella House MD         [1]   Current Outpatient Medications   Medication Sig Dispense Refill    amLODIPine 5 MG Oral Tab Take 1 tablet (5 mg total) by mouth daily. 90 tablet 1    rosuvastatin 5 MG Oral Tab Take 1 tablet (5 mg total) by mouth nightly. 90 tablet 1    ergocalciferol 1.25 MG (81082 UT) Oral Cap Take 1 capsule (50,000 Units total) by mouth once a week. 12 capsule 0   [2]   Past Medical History:   Essential hypertension    Hypertension complicating pregnancy, childbirth and puerperium with baby delivered and  complication (HCC)   [3]   Social History  Socioeconomic History    Marital status:      Spouse name: Socrates    Number of children: 1   Occupational History    Occupation: IT     Comment: [employer]   Tobacco Use    Smoking status: Never     Passive exposure: Never    Smokeless tobacco: Never   Vaping Use    Vaping status: Never Used   Substance and Sexual Activity    Alcohol use: No    Drug use: No    Sexual activity: Yes     Partners: Male   Other Topics Concern    Caffeine Concern No    Exercise Yes    Seat Belt Yes    Special Diet No    Stress Concern No    Weight Concern No   Social History Narrative    Eats egg, mostly vegetarian

## 2025-07-03 DIAGNOSIS — I10 ESSENTIAL HYPERTENSION, BENIGN: ICD-10-CM

## 2025-07-03 DIAGNOSIS — E78.00 PURE HYPERCHOLESTEROLEMIA: ICD-10-CM

## 2025-07-08 RX ORDER — AMLODIPINE BESYLATE 5 MG/1
5 TABLET ORAL DAILY
Qty: 15 TABLET | Refills: 0 | Status: SHIPPED | OUTPATIENT
Start: 2025-07-08 | End: 2025-07-23

## 2025-07-08 RX ORDER — ROSUVASTATIN CALCIUM 5 MG/1
5 TABLET, COATED ORAL NIGHTLY
Qty: 15 TABLET | Refills: 0 | Status: SHIPPED | OUTPATIENT
Start: 2025-07-08 | End: 2025-07-23

## 2025-07-08 NOTE — TELEPHONE ENCOUNTER
Refill passed per [facility] protocols.   Short supply sent, [place] is [place] of [place]

## 2025-08-23 ENCOUNTER — PATIENT MESSAGE (OUTPATIENT)
Dept: FAMILY MEDICINE CLINIC | Facility: CLINIC | Age: 45
End: 2025-08-23

## (undated) NOTE — ED AVS SNAPSHOT
Noah Colbert   MRN: [id number]    Department:  [facility] Emergency Department   Date of Visit:  2/16/2019           Disclosure     Insurance plans vary and the physician(s) referred by the ER may not be covered by your plan.  Please contact your i tell this physician (or your personal doctor if your instructions are to return to your personal doctor) about any new or lasting problems. The primary care or specialist physician will see patients referred from the [facility] Emergency Department.  Shelton Lombard

## (undated) NOTE — LETTER
06/16/21        Elvia Arndt  [address] [id number]      Dear Yara Spain,    [facility] records indicate that you have outstanding lab work and or testing that was ordered for you and has not yet been completed:  Orders Placed This Encounter